# Patient Record
Sex: FEMALE | Race: WHITE | Employment: OTHER | URBAN - METROPOLITAN AREA
[De-identification: names, ages, dates, MRNs, and addresses within clinical notes are randomized per-mention and may not be internally consistent; named-entity substitution may affect disease eponyms.]

---

## 2017-02-02 ENCOUNTER — HOSPITAL ENCOUNTER (OUTPATIENT)
Dept: LAB | Age: 67
Discharge: HOME | End: 2017-02-02

## 2017-03-14 ENCOUNTER — HOSPITAL ENCOUNTER (OUTPATIENT)
Dept: LAB | Age: 67
Discharge: HOME | End: 2017-03-14

## 2018-02-06 ENCOUNTER — HOSPITAL ENCOUNTER (OUTPATIENT)
Dept: MRI IMAGING | Age: 68
Discharge: HOME | End: 2018-02-06
Payer: COMMERCIAL

## 2018-02-06 ENCOUNTER — HOSPITAL ENCOUNTER (OUTPATIENT)
Dept: GENERAL RADIOLOGY | Age: 68
Discharge: HOME | End: 2018-02-06
Payer: COMMERCIAL

## 2018-02-06 DIAGNOSIS — M54.12 CERVICAL RADICULOPATHY: ICD-10-CM

## 2018-02-06 PROCEDURE — 72156 MRI NECK SPINE W/O & W/DYE: CPT

## 2018-02-06 PROCEDURE — 72050 X-RAY EXAM NECK SPINE 4/5VWS: CPT

## 2018-03-15 ENCOUNTER — OFFICE VISIT (OUTPATIENT)
Dept: NEUROLOGY | Age: 68
End: 2018-03-15

## 2018-03-15 VITALS
WEIGHT: 147 LBS | RESPIRATION RATE: 12 BRPM | TEMPERATURE: 98 F | DIASTOLIC BLOOD PRESSURE: 78 MMHG | HEART RATE: 71 BPM | OXYGEN SATURATION: 97 % | SYSTOLIC BLOOD PRESSURE: 124 MMHG

## 2018-03-15 DIAGNOSIS — I67.89 CEREBRAL MICROVASCULAR DISEASE: ICD-10-CM

## 2018-03-15 DIAGNOSIS — I65.23 BILATERAL CAROTID ARTERY STENOSIS: Primary | ICD-10-CM

## 2018-03-15 DIAGNOSIS — G56.01 CARPAL TUNNEL SYNDROME OF RIGHT WRIST: ICD-10-CM

## 2018-03-15 DIAGNOSIS — M96.1 CERVICAL POST-LAMINECTOMY SYNDROME: ICD-10-CM

## 2018-03-15 RX ORDER — MULTIVITAMIN
2 TABLET ORAL DAILY
COMMUNITY

## 2018-03-15 RX ORDER — ASPIRIN 81 MG/1
TABLET ORAL DAILY
COMMUNITY

## 2018-03-15 RX ORDER — BISMUTH SUBSALICYLATE 262 MG
1 TABLET,CHEWABLE ORAL DAILY
COMMUNITY

## 2018-03-15 RX ORDER — ATORVASTATIN CALCIUM 10 MG/1
10 TABLET, FILM COATED ORAL DAILY
COMMUNITY
Start: 2018-01-13 | End: 2021-11-04 | Stop reason: SDUPTHER

## 2018-03-15 RX ORDER — ALENDRONATE SODIUM 70 MG/1
70 TABLET ORAL
COMMUNITY
Start: 2018-03-05 | End: 2021-11-04 | Stop reason: ALTCHOICE

## 2018-03-15 RX ORDER — ESTRADIOL 10 UG/1
10 INSERT VAGINAL 2 TIMES WEEKLY
COMMUNITY
Start: 2017-11-12

## 2018-03-15 NOTE — MR AVS SNAPSHOT
[address], 
[id number], [address] [facility] 
[phone] Patient: Baljit Dexter MRN: [id number] IPX:[id number] Visit Information Date & Time Provider Department Dept. Phone Encounter #  
 3/15/2018 10:00 AM Cortes Vásquez MD [facility]rology [facility] at [facility] [phone] [id number] Follow-up Instructions Return in about 6 months (around 9/15/2018). Upcoming Health Maintenance Date Due Hepatitis C Screening 1950 DTaP/Tdap/Td series (1 - Tdap) 8/16/1972 FOBT Q 1 YEAR AGE 50-75 8/16/2001 ZOSTER VACCINE AGE 60> 6/16/2011 BREAST CANCER SCRN MAMMOGRAM 4/30/2016 GLAUCOMA SCREENING Q2Y 8/16/2016 Bone Densitometry (Dexa) Screening 8/16/2016 Pneumococcal 65+ Low/Medium Risk (1 of 2 - PCV13) 8/16/2016 MEDICARE YEARLY EXAM 8/16/2016 Influenza Age 5 to Adult 8/1/2017 Allergies as of 3/15/2018  Review Complete On: 3/15/2018 By: Cortes áVsquez MD  
 No Known Allergies Current Immunizations  Never Reviewed No immunizations on file. Not reviewed this visit You Were Diagnosed With   
  
 Codes Comments Bilateral carotid artery stenosis    -  Primary ICD-10-CM: A52.11 ICD-9-CM: 433.10, 433.30 Cerebral microvascular disease     ICD-10-CM: I67.9 ICD-9-CM: 437.9 Cervical post-laminectomy syndrome     ICD-10-CM: M96.1 ICD-9-CM: 722.81 Vitals BP Pulse Temp Resp Weight(growth percentile) SpO2  
 124/78 71 98 °F (36.7 °C) 12 147 lb (66.7 kg) 97% Smoking Status Never Smoker Preferred Pharmacy Pharmacy Name Phone [facility] [address] [place] [address] [address], [address] [facility] Tw[place] Castillo [phone] Your Updated Medication List  
  
   
This list is accurate as of 3/15/18 10:43 AM.  Always use your most recent med list.  
  
  
  
  
 alendronate 70 mg tablet Commonly known as:  FOSAMAX 70 mg every seven (7) days. atorvastatin 10 mg tablet Commonly known as:  LIPITOR 10 mg daily. calcium-cholecalciferol (D3) tablet Commonly known as:  CALTRATE 600+D Take 2 Tabs by mouth daily. estradiol 10 mcg Tab vaginal tablet Commonly known as:  Juan J Wheeler 10 mcg two (2) times a week. MEGARED OMEGA-3 KRILL OIL PO Take 1 Tab by mouth daily. multivitamin tablet Commonly known as:  ONE A DAY Take 1 Tab by mouth daily. PRESERVISION AREDS 2 PO Take 2 Tabs by mouth daily. We Performed the Following TYLER COMPREHENSIVE PLUS PANEL [[id number] Custom] HOMOCYSTEINE, PLASMA [id number] CPT(R)] SED RATE (ESR) [id number] CPT(R)] Follow-up Instructions Return in about 6 months (around 9/15/2018). To-Do List   
 03/15/2018 Imaging:  DUPLEX CAROTID BILATERAL AMB NEURO   
  
 03/21/2018 Imaging:  MRA BRAIN WO CONT   
  
 03/21/2018 Imaging:  MRI BRAIN W WO CONT Patient Instructions Please be advised there is a $25 fee for all paperwork to be completed from our  providers. This is to be paid by the patient prior to picking up the completed forms. A Healthy Lifestyle: Care Instructions Your Care Instructions A healthy lifestyle can help you feel good, stay at a healthy weight, and have plenty of energy for both work and play. A healthy lifestyle is something you can share with your whole family. A healthy lifestyle also can lower your risk for serious health problems, such as high blood pressure, heart disease, and diabetes. You can follow a few steps listed below to improve your health and the health of your family. Follow-up care is a key part of your treatment and safety. Be sure to make and go to all appointments, and call your doctor if you are having problems. It's also a good idea to know your test results and keep a list of the medicines you take. How can you care for yourself at home? · Do not eat too much sugar, fat, or fast foods. You can still have dessert and treats now and then. The goal is moderation. · Start small to improve your eating habits. Pay attention to portion sizes, drink less juice and soda pop, and eat more fruits and vegetables. ¨ Eat a healthy amount of food. A 3-ounce serving of meat, for example, is about the size of a deck of cards. Fill the rest of your plate with vegetables and whole grains. ¨ Limit the amount of soda and sports drinks you have every day. Drink more water when you are thirsty. ¨ Eat at least 5 servings of fruits and vegetables every day. It may seem like a lot, but it is not hard to reach this goal. A serving or helping is 1 piece of fruit, 1 cup of vegetables, or 2 cups of leafy, raw vegetables. Have an apple or some carrot sticks as an afternoon snack instead of a candy bar. Try to have fruits and/or vegetables at every meal. 
· Make exercise part of your daily routine. You may want to start with simple activities, such as walking, bicycling, or slow swimming. Try to be active 30 to 60 minutes every day. You do not need to do all 30 to 60 minutes all at once. For example, you can exercise 3 times a day for 10 or 20 minutes. Moderate exercise is safe for most people, but it is always a good idea to talk to your doctor before starting an exercise program. 
· Keep moving. Evalee Webb the lawn, work in the garden, or [employer]. Take the stairs instead of the elevator at work. · If you smoke, quit. People who smoke have an increased risk for heart attack, stroke, cancer, and other lung illnesses. Quitting is hard, but there are ways to boost your chance of quitting tobacco for good. ¨ Use nicotine gum, patches, or lozenges. ¨ Ask your doctor about stop-smoking programs and medicines. ¨ Keep trying.  
In addition to reducing your risk of diseases in the future, you will notice some benefits soon after you stop using tobacco. If you have shortness of breath or asthma symptoms, they will likely get better within a few weeks after you quit. · Limit how much alcohol you drink. Moderate amounts of alcohol (up to 2 drinks a day for men, 1 drink a day for women) are okay. But drinking too much can lead to liver problems, high blood pressure, and other health problems. Family health If you have a family, there are many things you can do together to improve your health. · Eat meals together as a family as often as possible. · Eat healthy foods. This includes fruits, vegetables, lean meats and dairy, and whole grains. · Include your family in your fitness plan. Most people think of activities such as jogging or tennis as the way to fitness, but there are many ways you and your family can be more active. Anything that makes you breathe hard and gets your heart pumping is exercise. Here are some tips: 
¨ Walk to do errands or to take your child to school or the bus. ¨ Go for a family bike ride after dinner instead of watching TV. Where can you learn more? Go to http://giuseppe-natty.info/. Enter S342 in the search box to learn more about \"A Healthy Lifestyle: Care Instructions. \" Current as of: May 12, 2017 Content Version: 11.4 © 9484-9055 Healthwise, [employer]. Care instructions adapted under license by [employer] (which disclaims liability or warranty for this information). If you have questions about a medical condition or this instruction, always ask your healthcare professional. [place] [address] any warranty or liability for your use of this information. Introducing [facility]! Dear Ankit Choi: Thank you for requesting a [employer] account. Our records indicate that you already have an active [employer] account. You can access your account anytime at [email or web address] Did you know that you can access your hospital and ER discharge instructions at any time in Tagged? You can also review all of your test results from your hospital stay or ER visit. Additional Information If you have questions, please visit the Frequently Asked Questions section of the Tagged website at [email or web address] Remember, Tagged is NOT to be used for urgent needs. For medical emergencies, dial 911. Now available from your iPhone and Android! Please provide this summary of care documentation to your next provider. Your primary care clinician is listed as Avtar Alexandre. If you have any questions after today's visit, please call [phone].

## 2018-03-15 NOTE — PATIENT INSTRUCTIONS
Please be advised there is a $25 fee for all paperwork to be completed from our  providers. This is to be paid by the patient prior to picking up the completed forms. A Healthy Lifestyle: Care Instructions  Your Care Instructions    A healthy lifestyle can help you feel good, stay at a healthy weight, and have plenty of energy for both work and play. A healthy lifestyle is something you can share with your whole family. A healthy lifestyle also can lower your risk for serious health problems, such as high blood pressure, heart disease, and diabetes. You can follow a few steps listed below to improve your health and the health of your family. Follow-up care is a key part of your treatment and safety. Be sure to make and go to all appointments, and call your doctor if you are having problems. It's also a good idea to know your test results and keep a list of the medicines you take. How can you care for yourself at home? · Do not eat too much sugar, fat, or fast foods. You can still have dessert and treats now and then. The goal is moderation. · Start small to improve your eating habits. Pay attention to portion sizes, drink less juice and soda pop, and eat more fruits and vegetables. ¨ Eat a healthy amount of food. A 3-ounce serving of meat, for example, is about the size of a deck of cards. Fill the rest of your plate with vegetables and whole grains. ¨ Limit the amount of soda and sports drinks you have every day. Drink more water when you are thirsty. ¨ Eat at least 5 servings of fruits and vegetables every day. It may seem like a lot, but it is not hard to reach this goal. A serving or helping is 1 piece of fruit, 1 cup of vegetables, or 2 cups of leafy, raw vegetables. Have an apple or some carrot sticks as an afternoon snack instead of a candy bar. Try to have fruits and/or vegetables at every meal.  · Make exercise part of your daily routine.  You may want to start with simple activities, such as walking, bicycling, or slow swimming. Try to be active 30 to 60 minutes every day. You do not need to do all 30 to 60 minutes all at once. For example, you can exercise 3 times a day for 10 or 20 minutes. Moderate exercise is safe for most people, but it is always a good idea to talk to your doctor before starting an exercise program.  · Keep moving. Sachin Hoop the lawn, work in the garden, or [employer]. Take the stairs instead of the elevator at work. · If you smoke, quit. People who smoke have an increased risk for heart attack, stroke, cancer, and other lung illnesses. Quitting is hard, but there are ways to boost your chance of quitting tobacco for good. ¨ Use nicotine gum, patches, or lozenges. ¨ Ask your doctor about stop-smoking programs and medicines. ¨ Keep trying. In addition to reducing your risk of diseases in the future, you will notice some benefits soon after you stop using tobacco. If you have shortness of breath or asthma symptoms, they will likely get better within a few weeks after you quit. · Limit how much alcohol you drink. Moderate amounts of alcohol (up to 2 drinks a day for men, 1 drink a day for women) are okay. But drinking too much can lead to liver problems, high blood pressure, and other health problems. Family health  If you have a family, there are many things you can do together to improve your health. · Eat meals together as a family as often as possible. · Eat healthy foods. This includes fruits, vegetables, lean meats and dairy, and whole grains. · Include your family in your fitness plan. Most people think of activities such as jogging or tennis as the way to fitness, but there are many ways you and your family can be more active. Anything that makes you breathe hard and gets your heart pumping is exercise. Here are some tips:  ¨ Walk to do errands or to take your child to school or the bus. ¨ Go for a family bike ride after dinner instead of watching TV.   Where can you learn more? Go to http://giuseppe-natty.info/. Enter T080 in the search box to learn more about \"A Healthy Lifestyle: Care Instructions. \"  Current as of: May 12, 2017  Content Version: 11.4  © 1426-6284 Healthwise, Incorporated. Care instructions adapted under license by [employer] (which disclaims liability or warranty for this information). If you have questions about a medical condition or this instruction, always ask your healthcare professional. [address] any warranty or liability for your use of this information.

## 2018-03-15 NOTE — LETTER
3/15/2018 8:10 PM 
 
Patient: Mack Elizabeth YOB: 1950 Date of Visit: 3/15/2018 Dear No Recipients: Thank you for referring Ms. Mack Elizabeth to me for evaluation/treatment. Below are the relevant portions of my assessment and plan of care. Consult REFERRED BY: 
Javed Coughlin MD 
 
CHIEF COMPLAINT: Abnormal MRI scan Subjective: Mack Elizabeth is a 78 y.o. right-handed  female referred to us by Dr. Eddie Quiros for evaluation of a new problem as a new patient to us for recent abnormal MRI scan of the cervical spine showing some prominent white matter disease in the brainstem of unclear etiology. The patient had MRI scan done because of increasing neck pain, was found to have stable postop cervical films, without significant recurrent cord compression or disc herniation. Patient never had a history of stroke or cerebrovascular disease, and no history of unusual head trauma, or unusual metabolic syndromes like hyponatremia that may cause central myelinolysis. Patient is not a drinker or smoker and uses no drugs as other causes of this problem. She has no real vascular risk factors other than mild hyperlipidemia for which he only takes Lipitor 10 mg a day. She has no high blood pressure, does not smoke, is not diabetic and is fairly healthy otherwise. Her only other problem is macular degeneration that is mild. She has no cardiac history to suggest cardial embolic disease to cause her problems. She has no family history of unusual cerebrovascular disease. She did have a history of migraine headaches. He has never had any unusual head trauma but did have a mild whiplash injury in the past 10 years ago. Her MRI scans were all reviewed personally on the PACS system by myself today and I agree with reports that she does have the mild to moderate microvascular disease present.   She was operated on in 2009 for the disc disease and myelopathy, and still has some mild residual symptoms involving the right arm and leg. She is also complaining of increasing paresthesias in her right hand, that seem to wake her up at nighttime, and has a Tinel's over the median nerve suggesting carpal tunnel syndrome. We will obtain an EMG for that. We try to get a carotid Doppler study today, but the patient could not stay for the test.  They will be rescheduled. She was scheduled for an MRI and MRA of the brain to look for causes of this microvascular disease. She is advised to start taking a baby aspirin every day. Her PCP is monitoring her cholesterol and is doing well. She is very concerned that she is a high risk for stroke something bad is going on in her brain. She really has had no other new focal weakness, sensory loss or visual changes other than the increasing numbness in her right hand. Past Medical History:  
Diagnosis Date  Arthritis  Macular degeneration  Osteoporosis Past Surgical History:  
Procedure Laterality Date  HX ORTHOPAEDIC    
 2009: disc replaced neck Family History Problem Relation Age of Onset  Heart Disease Mother  Stroke Mother  Hypertension Mother  Cancer Father   
  unknown primary  Diabetes Sister  Obesity Sister  Heart Disease Brother  Hypertension Brother  Seizures Child  Migraines Child Social History Substance Use Topics  Smoking status: Never Smoker  Smokeless tobacco: Never Used  Alcohol use No  
   
 
Current Outpatient Prescriptions:  
  alendronate (FOSAMAX) 70 mg tablet, 70 mg every seven (7) days. , Disp: , Rfl:  
  atorvastatin (LIPITOR) 10 mg tablet, 10 mg daily. , Disp: , Rfl:  
  estradiol (VAGIFEM) 10 mcg tab vaginal tablet, 10 mcg two (2) times a week., Disp: , Rfl:  
  multivitamin (ONE A DAY) tablet, Take 1 Tab by mouth daily. , Disp: , Rfl:  
   calcium-cholecalciferol, D3, (CALTRATE 600+D) tablet, Take 2 Tabs by mouth daily. , Disp: , Rfl:  
  VIT C/E/ZN/COPPR/LUTEIN/ZEAXAN (PRESERVISION AREDS 2 PO), Take 2 Tabs by mouth daily. , Disp: , Rfl:  
  KRILL/OM-3/DHA/EPA/PHOSPHO/AST (MEGARED OMEGA-3 KRILL OIL PO), Take 1 Tab by mouth daily. , Disp: , Rfl:  
 
 
 
No Known Allergies Review of Systems: A comprehensive review of systems was negative except for: Musculoskeletal: positive for myalgias, arthralgias, stiff joints and neck pain Neurological: positive for paresthesia Vitals:  
 03/15/18 1024 BP: 124/78 Pulse: 71 Resp: 12 Temp: 98 °F (36.7 °C) SpO2: 97% Weight: 147 lb (66.7 kg) Objective: I 
 
 
NEUROLOGICAL EXAM: 
 
Appearance: The patient is well developed, well nourished, provides a coherent history and is in no acute distress. Mental Status: Oriented to time, place and person, and the president, cognitive function is normal and speech is fluent and no aphasia or dysarthria. Mood and affect appropriate, but somewhat anxious. Cranial Nerves:   Intact visual fields. Fundi are benign. MARINA, EOM's full, no nystagmus, no ptosis. Facial sensation is normal. Corneal reflexes are not tested. Facial movement is symmetric. Hearing is normal bilaterally. Palate is midline with normal sternocleidomastoid and trapezius muscles are normal. Tongue is midline. Neck without meningismus or bruits Temporal arteries are not tender or enlarged Motor:  5/5 strength in upper and lower proximal and distal muscles. Normal bulk and tone. No fasciculations. Reflexes:   Deep tendon reflexes 2+/4 and symmetrical. 
No babinski or clonus present Patient has a prominent Tinel's over the right median nerve Sensory:   Normal to touch, pinprick and vibration and temperature. DSS is intact Gait:  Normal gait. Tremor:   No tremor noted. Cerebellar:  No cerebellar signs present. Neurovascular:  Normal heart sounds and regular rhythm, peripheral pulses decreased, and no carotid bruits. Assessment: ICD-10-CM ICD-9-CM 1. Bilateral carotid artery stenosis I65.23 433.10 alendronate (FOSAMAX) 70 mg tablet 433.30 atorvastatin (LIPITOR) 10 mg tablet  
   estradiol (VAGIFEM) 10 mcg tab vaginal tablet  
   multivitamin (ONE A DAY) tablet  
   calcium-cholecalciferol, D3, (CALTRATE 600+D) tablet VIT C/E/ZN/COPPR/LUTEIN/ZEAXAN (PRESERVISION AREDS 2 PO) KRILL/OM-3/DHA/EPA/PHOSPHO/AST (MEGARED OMEGA-3 KRILL OIL PO) MRI BRAIN W WO CONT  
   DUPLEX CAROTID BILATERAL AMB NEURO  
   MRA BRAIN WO CONT  
   TYLER COMPREHENSIVE PLUS PANEL  
   SED RATE (ESR) HOMOCYSTEINE, PLASMA  
   EMG LIMITED 2. Cerebral microvascular disease I67.9 437.9 alendronate (FOSAMAX) 70 mg tablet  
   atorvastatin (LIPITOR) 10 mg tablet  
   estradiol (VAGIFEM) 10 mcg tab vaginal tablet  
   multivitamin (ONE A DAY) tablet  
   calcium-cholecalciferol, D3, (CALTRATE 600+D) tablet VIT C/E/ZN/COPPR/LUTEIN/ZEAXAN (PRESERVISION AREDS 2 PO) KRILL/OM-3/DHA/EPA/PHOSPHO/AST (MEGARED OMEGA-3 KRILL OIL PO) MRI BRAIN W WO CONT  
   DUPLEX CAROTID BILATERAL AMB NEURO  
   MRA BRAIN WO CONT  
   TYLER COMPREHENSIVE PLUS PANEL  
   SED RATE (ESR) HOMOCYSTEINE, PLASMA  
   EMG LIMITED 3. Cervical post-laminectomy syndrome, 2009 Dr Dave Mckinley M96.1 722.81 alendronate (FOSAMAX) 70 mg tablet  
   atorvastatin (LIPITOR) 10 mg tablet  
   estradiol (VAGIFEM) 10 mcg tab vaginal tablet  
   multivitamin (ONE A DAY) tablet  
   calcium-cholecalciferol, D3, (CALTRATE 600+D) tablet VIT C/E/ZN/COPPR/LUTEIN/ZEAXAN (PRESERVISION AREDS 2 PO) KRILL/OM-3/DHA/EPA/PHOSPHO/AST (MEGARED OMEGA-3 KRILL OIL PO) MRI BRAIN W WO CONT  
   DUPLEX CAROTID BILATERAL AMB NEURO  
   MRA BRAIN WO CONT  
   TYLER COMPREHENSIVE PLUS PANEL  
   SED RATE (ESR) HOMOCYSTEINE, PLASMA  
   EMG LIMITED 4. Carpal tunnel syndrome of right wrist G56.01 354.0 alendronate (FOSAMAX) 70 mg tablet  
   atorvastatin (LIPITOR) 10 mg tablet  
   estradiol (VAGIFEM) 10 mcg tab vaginal tablet  
   multivitamin (ONE A DAY) tablet  
   calcium-cholecalciferol, D3, (CALTRATE 600+D) tablet VIT C/E/ZN/COPPR/LUTEIN/ZEAXAN (PRESERVISION AREDS 2 PO) KRILL/OM-3/DHA/EPA/PHOSPHO/AST (MEGARED OMEGA-3 KRILL OIL PO) MRI BRAIN W WO CONT  
   DUPLEX CAROTID BILATERAL AMB NEURO  
   MRA BRAIN WO CONT  
   TYLER COMPREHENSIVE PLUS PANEL  
   SED RATE (ESR) HOMOCYSTEINE, PLASMA  
   EMG LIMITED Active Problems: * No active hospital problems. * 
 
 
Plan:  
 
Patient was an MRI scan is clearly abnormal showing prominent white matter disease in the brainstem that needs further evaluation to rule out cerebral vascular occlusive disease, arthritis, cerebritis, or other causes of her symptoms. She will get an MRI of the brain, with and without contrast, and an MRA of the brain She also was scheduled for carotid Doppler which he could not get today but will come back and get soon. She is to start aspirin every day now. She is to call us immediately for the problem in the interim. Multiple metabolic studies were also done to rule out vasculitis, arteritis, or other causes of her symptoms. Follow-up will be arranged in 3-6 months time after this test, and she will check the results of my chart will call us. Patient is a high risk for further neurologic deterioration she indeed has major vascular disease of the brain or arteritis. Patient will also be scheduled for an EMG because of her possible carpal tunnel syndrome on the right and left side Her films are reviewed personally on the PACS system by myself today, and I agree with reports, and she definitely needs further workup, and her films were reviewed with the patient in detail explaining the abnormality possible diagnosis prognosis and treatment options for the patient. Signed By: Hoang Pereira MD   
 March 15, 2018 CC: Elvie Taylor MD 
FAX: [phone] This note will not be viewable in [address] If you have questions, please do not hesitate to call me. I look forward to following MsEdward Shant Garcia along with you. Sincerely, Hoang Pereira MD

## 2018-03-16 LAB
CENTROMERE B AB SER-ACNC: <0.2 AI (ref 0–0.9)
CHROMATIN AB SERPL-ACNC: <0.2 AI (ref 0–0.9)
DSDNA AB SER-ACNC: 3 IU/ML (ref 0–9)
ENA JO1 AB SER-ACNC: <0.2 AI (ref 0–0.9)
ENA RNP AB SER-ACNC: <0.2 AI (ref 0–0.9)
ENA SCL70 AB SER-ACNC: <0.2 AI (ref 0–0.9)
ENA SM AB SER-ACNC: <0.2 AI (ref 0–0.9)
ENA SM+RNP AB SER-ACNC: <0.2 AI (ref 0–0.9)
ENA SS-A AB SER-ACNC: <0.2 AI (ref 0–0.9)
ENA SS-B AB SER-ACNC: <0.2 AI (ref 0–0.9)
ERYTHROCYTE [SEDIMENTATION RATE] IN BLOOD BY WESTERGREN METHOD: 2 MM/HR (ref 0–40)
HCYS SERPL-SCNC: 7.8 UMOL/L (ref 0–15)
RIBOSOMAL P AB SER-ACNC: <0.2 AI (ref 0–0.9)
SEE BELOW:, 164879: NORMAL

## 2018-03-16 NOTE — PROGRESS NOTES
Consult  REFERRED BY:  Abida Reid MD    CHIEF COMPLAINT: Abnormal MRI scan      Subjective: Germania Padilla is a 78 y.o. right-handed  female referred to us by Dr. Nile Orosco for evaluation of a new problem as a new patient to us for recent abnormal MRI scan of the cervical spine showing some prominent white matter disease in the brainstem of unclear etiology. The patient had MRI scan done because of increasing neck pain, was found to have stable postop cervical films, without significant recurrent cord compression or disc herniation. Patient never had a history of stroke or cerebrovascular disease, and no history of unusual head trauma, or unusual metabolic syndromes like hyponatremia that may cause central myelinolysis. Patient is not a drinker or smoker and uses no drugs as other causes of this problem. She has no real vascular risk factors other than mild hyperlipidemia for which he only takes Lipitor 10 mg a day. She has no high blood pressure, does not smoke, is not diabetic and is fairly healthy otherwise. Her only other problem is macular degeneration that is mild. She has no cardiac history to suggest cardial embolic disease to cause her problems. She has no family history of unusual cerebrovascular disease. She did have a history of migraine headaches. He has never had any unusual head trauma but did have a mild whiplash injury in the past 10 years ago. Her MRI scans were all reviewed personally on the PACS system by myself today and I agree with reports that she does have the mild to moderate microvascular disease present. She was operated on in 2009 for the disc disease and myelopathy, and still has some mild residual symptoms involving the right arm and leg. She is also complaining of increasing paresthesias in her right hand, that seem to wake her up at nighttime, and has a Tinel's over the median nerve suggesting carpal tunnel syndrome. We will obtain an EMG for that.   We try to get a carotid Doppler study today, but the patient could not stay for the test.  They will be rescheduled. She was scheduled for an MRI and MRA of the brain to look for causes of this microvascular disease. She is advised to start taking a baby aspirin every day. Her PCP is monitoring her cholesterol and is doing well. She is very concerned that she is a high risk for stroke something bad is going on in her brain. She really has had no other new focal weakness, sensory loss or visual changes other than the increasing numbness in her right hand. Past Medical History:   Diagnosis Date    Arthritis     Macular degeneration     Osteoporosis       Past Surgical History:   Procedure Laterality Date    HX ORTHOPAEDIC      2009: disc replaced neck     Family History   Problem Relation Age of Onset    Heart Disease Mother     Stroke Mother     Hypertension Mother     Cancer Father      unknown primary    Diabetes Sister     Obesity Sister     Heart Disease Brother     Hypertension Brother     Seizures Child     Migraines Child       Social History   Substance Use Topics    Smoking status: Never Smoker    Smokeless tobacco: Never Used    Alcohol use No         Current Outpatient Prescriptions:     alendronate (FOSAMAX) 70 mg tablet, 70 mg every seven (7) days. , Disp: , Rfl:     atorvastatin (LIPITOR) 10 mg tablet, 10 mg daily. , Disp: , Rfl:     estradiol (VAGIFEM) 10 mcg tab vaginal tablet, 10 mcg two (2) times a week., Disp: , Rfl:     multivitamin (ONE A DAY) tablet, Take 1 Tab by mouth daily. , Disp: , Rfl:     calcium-cholecalciferol, D3, (CALTRATE 600+D) tablet, Take 2 Tabs by mouth daily. , Disp: , Rfl:     VIT C/E/ZN/COPPR/LUTEIN/ZEAXAN (PRESERVISION AREDS 2 PO), Take 2 Tabs by mouth daily. , Disp: , Rfl:     KRILL/OM-3/DHA/EPA/PHOSPHO/AST (MEGARED OMEGA-3 KRILL OIL PO), Take 1 Tab by mouth daily. , Disp: , Rfl:         No Known Allergies     Review of Systems:  A comprehensive review of systems was negative except for: Musculoskeletal: positive for myalgias, arthralgias, stiff joints and neck pain  Neurological: positive for paresthesia   Vitals:    03/15/18 1024   BP: 124/78   Pulse: 71   Resp: 12   Temp: 98 °F (36.7 °C)   SpO2: 97%   Weight: 147 lb (66.7 kg)     Objective:     I      NEUROLOGICAL EXAM:    Appearance: The patient is well developed, well nourished, provides a coherent history and is in no acute distress. Mental Status: Oriented to time, place and person, and the president, cognitive function is normal and speech is fluent and no aphasia or dysarthria. Mood and affect appropriate, but somewhat anxious. Cranial Nerves:   Intact visual fields. Fundi are benign. MARINA, EOM's full, no nystagmus, no ptosis. Facial sensation is normal. Corneal reflexes are not tested. Facial movement is symmetric. Hearing is normal bilaterally. Palate is midline with normal sternocleidomastoid and trapezius muscles are normal. Tongue is midline. Neck without meningismus or bruits  Temporal arteries are not tender or enlarged   Motor:  5/5 strength in upper and lower proximal and distal muscles. Normal bulk and tone. No fasciculations. Reflexes:   Deep tendon reflexes 2+/4 and symmetrical.  No babinski or clonus present  Patient has a prominent Tinel's over the right median nerve   Sensory:   Normal to touch, pinprick and vibration and temperature. DSS is intact   Gait:  Normal gait. Tremor:   No tremor noted. Cerebellar:  No cerebellar signs present. Neurovascular:  Normal heart sounds and regular rhythm, peripheral pulses decreased, and no carotid bruits.            Assessment:       ICD-10-CM ICD-9-CM    1. Bilateral carotid artery stenosis I65.23 433.10 alendronate (FOSAMAX) 70 mg tablet     433.30 atorvastatin (LIPITOR) 10 mg tablet      estradiol (VAGIFEM) 10 mcg tab vaginal tablet      multivitamin (ONE A DAY) tablet      calcium-cholecalciferol, D3, (CALTRATE 600+D) tablet      VIT C/E/ZN/COPPR/LUTEIN/ZEAXAN (PRESERVISION AREDS 2 PO)      KRILL/OM-3/DHA/EPA/PHOSPHO/AST (MEGARED OMEGA-3 KRILL OIL PO)      MRI BRAIN W WO CONT      DUPLEX CAROTID BILATERAL AMB NEURO      MRA BRAIN WO CONT      TYLER COMPREHENSIVE PLUS PANEL      SED RATE (ESR)      HOMOCYSTEINE, PLASMA      EMG LIMITED   2. Cerebral microvascular disease I67.9 437.9 alendronate (FOSAMAX) 70 mg tablet      atorvastatin (LIPITOR) 10 mg tablet      estradiol (VAGIFEM) 10 mcg tab vaginal tablet      multivitamin (ONE A DAY) tablet      calcium-cholecalciferol, D3, (CALTRATE 600+D) tablet      VIT C/E/ZN/COPPR/LUTEIN/ZEAXAN (PRESERVISION AREDS 2 PO)      KRILL/OM-3/DHA/EPA/PHOSPHO/AST (MEGARED OMEGA-3 KRILL OIL PO)      MRI BRAIN W WO CONT      DUPLEX CAROTID BILATERAL AMB NEURO      MRA BRAIN WO CONT      TYLER COMPREHENSIVE PLUS PANEL      SED RATE (ESR)      HOMOCYSTEINE, PLASMA      EMG LIMITED   3. Cervical post-laminectomy syndrome, 2009 Dr Mendieta Rolling M96.1 722.81 alendronate (FOSAMAX) 70 mg tablet      atorvastatin (LIPITOR) 10 mg tablet      estradiol (VAGIFEM) 10 mcg tab vaginal tablet      multivitamin (ONE A DAY) tablet      calcium-cholecalciferol, D3, (CALTRATE 600+D) tablet      VIT C/E/ZN/COPPR/LUTEIN/ZEAXAN (PRESERVISION AREDS 2 PO)      KRILL/OM-3/DHA/EPA/PHOSPHO/AST (MEGARED OMEGA-3 KRILL OIL PO)      MRI BRAIN W WO CONT      DUPLEX CAROTID BILATERAL AMB NEURO      MRA BRAIN WO CONT      TYLER COMPREHENSIVE PLUS PANEL      SED RATE (ESR)      HOMOCYSTEINE, PLASMA      EMG LIMITED   4.  Carpal tunnel syndrome of right wrist G56.01 354.0 alendronate (FOSAMAX) 70 mg tablet      atorvastatin (LIPITOR) 10 mg tablet      estradiol (VAGIFEM) 10 mcg tab vaginal tablet      multivitamin (ONE A DAY) tablet      calcium-cholecalciferol, D3, (CALTRATE 600+D) tablet      VIT C/E/ZN/COPPR/LUTEIN/ZEAXAN (PRESERVISION AREDS 2 PO)      KRILL/OM-3/DHA/EPA/PHOSPHO/AST (MEGARED OMEGA-3 KRILL OIL PO)      MRI BRAIN W WO CONT      DUPLEX CAROTID BILATERAL AMB NEURO      MRA BRAIN WO CONT      TYLER COMPREHENSIVE PLUS PANEL      SED RATE (ESR)      HOMOCYSTEINE, PLASMA      EMG LIMITED     Active Problems:    * No active hospital problems. *      Plan:     Patient was an MRI scan is clearly abnormal showing prominent white matter disease in the brainstem that needs further evaluation to rule out cerebral vascular occlusive disease, arthritis, cerebritis, or other causes of her symptoms. She will get an MRI of the brain, with and without contrast, and an MRA of the brain  She also was scheduled for carotid Doppler which he could not get today but will come back and get soon. She is to start aspirin every day now. She is to call us immediately for the problem in the interim. Multiple metabolic studies were also done to rule out vasculitis, arteritis, or other causes of her symptoms. Follow-up will be arranged in 3-6 months time after this test, and she will check the results of my chart will call us. Patient is a high risk for further neurologic deterioration she indeed has major vascular disease of the brain or arteritis. Patient will also be scheduled for an EMG because of her possible carpal tunnel syndrome on the right and left side  Her films are reviewed personally on the PACS system by myself today, and I agree with reports, and she definitely needs further workup, and her films were reviewed with the patient in detail explaining the abnormality possible diagnosis prognosis and treatment options for the patient. Signed By: Maritza Estevez MD     March 15, 2018       CC: Jovany García MD  FAX: [phone]    This note will not be viewable in [address]

## 2018-03-20 ENCOUNTER — OFFICE VISIT (OUTPATIENT)
Dept: NEUROLOGY | Age: 68
End: 2018-03-20

## 2018-03-20 DIAGNOSIS — G56.01 CARPAL TUNNEL SYNDROME OF RIGHT WRIST: ICD-10-CM

## 2018-03-20 DIAGNOSIS — I65.23 BILATERAL CAROTID ARTERY STENOSIS: ICD-10-CM

## 2018-03-20 DIAGNOSIS — M96.1 CERVICAL POST-LAMINECTOMY SYNDROME: ICD-10-CM

## 2018-03-20 DIAGNOSIS — I67.89 CEREBRAL MICROVASCULAR DISEASE: Primary | ICD-10-CM

## 2018-03-20 NOTE — PROCEDURES
This study consisted of pulsed wave Doppler examination, Color-flow imaging, and Duplex imaging of both the right and left carotid systems, and both vertebral arteries.        Imaging of both right and left carotid systems showed no mixed plaquing at the bifurcations and proximal and distal internal and external carotid arteries bilaterally, with stenosis in the range of 0% only and with no flow abnormalities identified.        Both vertebral arteries showed normal antegrade flow.         Clinical correlation recommended

## 2018-03-29 ENCOUNTER — HOSPITAL ENCOUNTER (OUTPATIENT)
Dept: MRI IMAGING | Age: 68
Discharge: HOME | End: 2018-03-29
Payer: COMMERCIAL

## 2018-03-29 DIAGNOSIS — M96.1 CERVICAL POST-LAMINECTOMY SYNDROME: ICD-10-CM

## 2018-03-29 DIAGNOSIS — G56.01 CARPAL TUNNEL SYNDROME OF RIGHT WRIST: ICD-10-CM

## 2018-03-29 DIAGNOSIS — I67.89 CEREBRAL MICROVASCULAR DISEASE: ICD-10-CM

## 2018-03-29 DIAGNOSIS — I65.23 BILATERAL CAROTID ARTERY STENOSIS: ICD-10-CM

## 2018-03-29 DIAGNOSIS — M96.1 POSTLAMINECTOMY SYNDROME, CERVICAL REGION: ICD-10-CM

## 2018-03-29 PROCEDURE — 70553 MRI BRAIN STEM W/O & W/DYE: CPT

## 2018-03-29 PROCEDURE — 70544 MR ANGIOGRAPHY HEAD W/O DYE: CPT

## 2018-03-29 PROCEDURE — A9576 INJ PROHANCE MULTIPACK: HCPCS | Performed by: RADIOLOGY

## 2018-03-29 RX ORDER — SODIUM CHLORIDE 0.9 % (FLUSH) 0.9 %
10 SYRINGE (ML) INJECTION
Status: COMPLETED | OUTPATIENT
Start: 2018-03-29 | End: 2018-03-29

## 2018-03-29 RX ADMIN — Medication 10 ML: at 13:00

## 2018-09-29 ENCOUNTER — HOSPITAL ENCOUNTER (EMERGENCY)
Age: 68
Discharge: HOME | End: 2018-09-29
Attending: EMERGENCY MEDICINE
Payer: COMMERCIAL

## 2018-09-29 ENCOUNTER — APPOINTMENT (OUTPATIENT)
Dept: GENERAL RADIOLOGY | Age: 68
End: 2018-09-29
Attending: EMERGENCY MEDICINE
Payer: COMMERCIAL

## 2018-09-29 DIAGNOSIS — R07.2 PRECORDIAL PAIN: Primary | ICD-10-CM

## 2018-09-29 LAB
ALBUMIN SERPL-MCNC: 4.1 G/DL (ref 3.5–5)
ALBUMIN/GLOB SERPL: 1.3 {RATIO} (ref 1.1–2.2)
ALP SERPL-CCNC: 45 U/L (ref 45–117)
ALT SERPL-CCNC: 26 U/L (ref 12–78)
ANION GAP SERPL CALC-SCNC: 5 MMOL/L (ref 5–15)
AST SERPL-CCNC: 22 U/L (ref 15–37)
BASOPHILS # BLD: 0 K/UL (ref 0–0.1)
BASOPHILS NFR BLD: 1 % (ref 0–1)
BILIRUB SERPL-MCNC: 0.5 MG/DL (ref 0.2–1)
BUN SERPL-MCNC: 23 MG/DL (ref 6–20)
BUN/CREAT SERPL: 24 (ref 12–20)
CALCIUM SERPL-MCNC: 8.9 MG/DL (ref 8.5–10.1)
CHLORIDE SERPL-SCNC: 106 MMOL/L (ref 97–108)
CK SERPL-CCNC: 162 U/L (ref 26–192)
CO2 SERPL-SCNC: 28 MMOL/L (ref 21–32)
COMMENT, HOLDF: NORMAL
CREAT SERPL-MCNC: 0.94 MG/DL (ref 0.55–1.02)
DIFFERENTIAL METHOD BLD: NORMAL
EOSINOPHIL # BLD: 0.2 K/UL (ref 0–0.4)
EOSINOPHIL NFR BLD: 3 % (ref 0–7)
ERYTHROCYTE [DISTWIDTH] IN BLOOD BY AUTOMATED COUNT: 13.1 % (ref 11.5–14.5)
GLOBULIN SER CALC-MCNC: 3.2 G/DL (ref 2–4)
GLUCOSE SERPL-MCNC: 89 MG/DL (ref 65–100)
HCT VFR BLD AUTO: 40.8 % (ref 35–47)
HGB BLD-MCNC: 13.3 G/DL (ref 11.5–16)
IMM GRANULOCYTES # BLD: 0 K/UL (ref 0–0.04)
IMM GRANULOCYTES NFR BLD AUTO: 0 % (ref 0–0.5)
INR PPP: 1 (ref 0.9–1.1)
LYMPHOCYTES # BLD: 2.4 K/UL (ref 0.8–3.5)
LYMPHOCYTES NFR BLD: 34 % (ref 12–49)
MCH RBC QN AUTO: 30 PG (ref 26–34)
MCHC RBC AUTO-ENTMCNC: 32.6 G/DL (ref 30–36.5)
MCV RBC AUTO: 92.1 FL (ref 80–99)
MONOCYTES # BLD: 0.7 K/UL (ref 0–1)
MONOCYTES NFR BLD: 10 % (ref 5–13)
NEUTS SEG # BLD: 3.8 K/UL (ref 1.8–8)
NEUTS SEG NFR BLD: 53 % (ref 32–75)
NRBC # BLD: 0 K/UL (ref 0–0.01)
NRBC BLD-RTO: 0 PER 100 WBC
PLATELET # BLD AUTO: 222 K/UL (ref 150–400)
PMV BLD AUTO: 10.4 FL (ref 8.9–12.9)
POTASSIUM SERPL-SCNC: 3.9 MMOL/L (ref 3.5–5.1)
PROT SERPL-MCNC: 7.3 G/DL (ref 6.4–8.2)
PROTHROMBIN TIME: 9.9 SEC (ref 9–11.1)
RBC # BLD AUTO: 4.43 M/UL (ref 3.8–5.2)
SAMPLES BEING HELD,HOLD: NORMAL
SODIUM SERPL-SCNC: 139 MMOL/L (ref 136–145)
TROPONIN I SERPL-MCNC: <0.05 NG/ML
TROPONIN I SERPL-MCNC: <0.05 NG/ML
WBC # BLD AUTO: 7.2 K/UL (ref 3.6–11)

## 2018-09-29 PROCEDURE — 74011250637 HC RX REV CODE- 250/637: Performed by: EMERGENCY MEDICINE

## 2018-09-29 PROCEDURE — 82550 ASSAY OF CK (CPK): CPT | Performed by: EMERGENCY MEDICINE

## 2018-09-29 PROCEDURE — 93005 ELECTROCARDIOGRAM TRACING: CPT

## 2018-09-29 PROCEDURE — 99284 EMERGENCY DEPT VISIT MOD MDM: CPT

## 2018-09-29 PROCEDURE — 85610 PROTHROMBIN TIME: CPT | Performed by: EMERGENCY MEDICINE

## 2018-09-29 PROCEDURE — 84484 ASSAY OF TROPONIN QUANT: CPT | Performed by: EMERGENCY MEDICINE

## 2018-09-29 PROCEDURE — 71046 X-RAY EXAM CHEST 2 VIEWS: CPT

## 2018-09-29 PROCEDURE — 80053 COMPREHEN METABOLIC PANEL: CPT | Performed by: EMERGENCY MEDICINE

## 2018-09-29 PROCEDURE — 36415 COLL VENOUS BLD VENIPUNCTURE: CPT | Performed by: EMERGENCY MEDICINE

## 2018-09-29 PROCEDURE — 85025 COMPLETE CBC W/AUTO DIFF WBC: CPT | Performed by: EMERGENCY MEDICINE

## 2018-09-29 RX ORDER — ASPIRIN 325 MG
325 TABLET ORAL
Status: COMPLETED | OUTPATIENT
Start: 2018-09-29 | End: 2018-09-29

## 2018-09-29 RX ADMIN — ASPIRIN 325 MG: 325 TABLET ORAL at 21:13

## 2018-09-29 NOTE — ED TRIAGE NOTES
Chest pain onset 7pm with dizziness and jaw pain lasting 5-10 minutes. \"Pain subsided but my jaws still feel funny and I'm tired\".

## 2018-09-30 VITALS
OXYGEN SATURATION: 96 % | RESPIRATION RATE: 11 BRPM | TEMPERATURE: 97.4 F | HEART RATE: 71 BPM | BODY MASS INDEX: 24.03 KG/M2 | HEIGHT: 65 IN | DIASTOLIC BLOOD PRESSURE: 70 MMHG | WEIGHT: 144.25 LBS | SYSTOLIC BLOOD PRESSURE: 136 MMHG

## 2018-09-30 LAB
ATRIAL RATE: 76 BPM
CALCULATED P AXIS, ECG09: 70 DEGREES
CALCULATED R AXIS, ECG10: -5 DEGREES
CALCULATED T AXIS, ECG11: 39 DEGREES
DIAGNOSIS, 93000: NORMAL
P-R INTERVAL, ECG05: 166 MS
Q-T INTERVAL, ECG07: 372 MS
QRS DURATION, ECG06: 74 MS
QTC CALCULATION (BEZET), ECG08: 418 MS
VENTRICULAR RATE, ECG03: 76 BPM

## 2018-09-30 NOTE — ED NOTES
2354:  MD reviewed discharge instructions and options with patient and patient verbalized understanding. RN reviewed discharge instructions using teachback method. Pt ambulated to exit without difficulty and in no signs of acute distress escorted by spouse, and spouse will drive home. No complaints or needs expressed at this time. Patient was counseled on medications prescribed at discharge. VSS, verbalized relief from most intense pain. Patient to call Dr. Shelby Florez in the morning for appointment.

## 2018-09-30 NOTE — DISCHARGE INSTRUCTIONS
Chest Pain: Care Instructions  Your Care Instructions    There are many things that can cause chest pain. Some are not serious and will get better on their own in a few days. But some kinds of chest pain need more testing and treatment. Your doctor may have recommended a follow-up visit in the next 8 to 12 hours. If you are not getting better, you may need more tests or treatment. Even though your doctor has released you, you still need to watch for any problems. The doctor carefully checked you, but sometimes problems can develop later. If you have new symptoms or if your symptoms do not get better, get medical care right away. If you have worse or different chest pain or pressure that lasts more than 5 minutes or you passed out (lost consciousness), call 911 or seek other emergency help right away. A medical visit is only one step in your treatment. Even if you feel better, you still need to do what your doctor recommends, such as going to all suggested follow-up appointments and taking medicines exactly as directed. This will help you recover and help prevent future problems. How can you care for yourself at home? · Rest until you feel better. · Take your medicine exactly as prescribed. Call your doctor if you think you are having a problem with your medicine. · Do not drive after taking a prescription pain medicine. When should you call for help? Call 911 if:    · You passed out (lost consciousness).     · You have severe difficulty breathing.     · You have symptoms of a heart attack. These may include:  ¨ Chest pain or pressure, or a strange feeling in your chest.  ¨ Sweating. ¨ Shortness of breath. ¨ Nausea or vomiting. ¨ Pain, pressure, or a strange feeling in your back, neck, jaw, or upper belly or in one or both shoulders or arms. ¨ Lightheadedness or sudden weakness. ¨ A fast or irregular heartbeat.   After you call 911, the  may tell you to chew 1 adult-strength or 2 to 4 low-dose aspirin. Wait for an ambulance. Do not try to drive yourself.    Call your doctor today if:    · You have any trouble breathing.     · Your chest pain gets worse.     · You are dizzy or lightheaded, or you feel like you may faint.     · You are not getting better as expected.     · You are having new or different chest pain. Where can you learn more? Go to http://giuseppe-natty.info/. Enter A120 in the search box to learn more about \"Chest Pain: Care Instructions. \"  Current as of: November 20, 2017  Content Version: 11.7  © 1751-8893 [employer]. Care instructions adapted under license by [employer] (which disclaims liability or warranty for this information). If you have questions about a medical condition or this instruction, always ask your healthcare professional. [address] any warranty or liability for your use of this information.

## 2018-09-30 NOTE — ED PROVIDER NOTES
HPI Comments: 80 y.o. female with past medical history significant for Hypercholesterolemia who presents from Home with chief complaint of Chest Pain. Patient states sudden onset \"at 1900\" of episodic mid sternal chest pain. Patient reports episode of mid sternal chest pain described as \"pressure\" with radiation into her right shoulder down to her abdomen and into her left jaw that lasted for a duration of \"about 10 to 15 minutes\". Pt states accompanying dizziness. Patient reports relieving symptoms since onset. Upon arrival to [facility] ED, patient denies any current pain or discomfort. Pt denies previous history of symptoms similar to those presented today. Pt denies significant cardiac history. Pt denies fever, chills, cough, congestion, shortness of breath, abdominal pain, nausea, vomiting, diarrhea, difficulty with urination or dysuria. There are no other acute medical concerns at this time. PCP: Key Denton MD 
 
Social History: Tobacco Use: No 
 
Note written by Leanna Tran, as dictated by Nina Hauser MD 8:37 PM 
 
The history is provided by the patient. Past Medical History:  
Diagnosis Date  Arthritis  Macular degeneration  Osteoporosis Past Surgical History:  
Procedure Laterality Date  HX ORTHOPAEDIC    
 2009: disc replaced neck Family History:  
Problem Relation Age of Onset  Heart Disease Mother  Stroke Mother  Hypertension Mother  Cancer Father   
  unknown primary  Diabetes Sister  Obesity Sister  Heart Disease Brother  Hypertension Brother  Seizures Child  Migraines Child Social History Social History  Marital status:  Spouse name: N/A  
 Number of children: N/A  
 Years of education: N/A Occupational History  Not on file. Social History Main Topics  Smoking status: Never Smoker  Smokeless tobacco: Never Used  Alcohol use No  
 Drug use: Not on file  Sexual activity: Not on file Other Topics Concern  Not on file Social History Narrative ALLERGIES: Review of patient's allergies indicates no known allergies. Review of Systems Constitutional: Negative for chills and fever. HENT: Negative for congestion. Respiratory: Negative for cough and shortness of breath. Cardiovascular: Positive for chest pain (Resolved). Gastrointestinal: Negative for abdominal pain, diarrhea, nausea and vomiting. Genitourinary: Negative for difficulty urinating and dysuria. Neurological: Positive for dizziness (Resolved). All other systems reviewed and are negative. Vitals:  
 09/29/18 1938 BP: 157/85 Pulse: 78 Resp: 16 Temp: 97.9 °F (36.6 °C) SpO2: 97% Weight: 65.4 kg (144 lb 4 oz) Height: 5' 5\" (1.651 m) Physical Exam  
Constitutional: She appears well-developed and well-nourished. No distress. HENT:  
Head: Normocephalic and atraumatic. Eyes: Conjunctivae are normal.  
Neck: Neck supple. Cardiovascular: Normal rate, regular rhythm and normal heart sounds. Pulmonary/Chest: Effort normal and breath sounds normal. No stridor. No respiratory distress. Abdominal: She exhibits no distension. Musculoskeletal: Normal range of motion. Neurological: She is alert. Coordination normal.  
Skin: Skin is warm and dry. Psychiatric: She has a normal mood and affect. Nursing note and vitals reviewed. Note written by Deanna Lewis, as dictated by Teresa Wang MD 8:40 PM 
 
MDM 
 
80 y.o. female presents with episode of chest , jaw and epigastric pain earlier tonight. Serial troponins negative with nonischemic EKG and resolved pain. Only risk factors of age and HLD. Doubt ACS currently, DDX includes coronary vasospasm or GI etiology. Discussed strict return precautions for recurrent pain and cardiology follow up for further evaluation.  Plan to follow up with PCP as needed and return precautions discussed for worsening or new concerning symptoms. ED Course Procedures EKG 1937: Rate 76 normal EKG, normal sinus rhythm, no ST/T abnormalities.

## 2021-10-28 ENCOUNTER — TELEPHONE (OUTPATIENT)
Dept: INTERNAL MEDICINE CLINIC | Age: 71
End: 2021-10-28

## 2021-10-28 NOTE — TELEPHONE ENCOUNTER
I called patient to confirm her appt next Thursday, 11/4/21 with Dr. Lee Estrada. Patient asked if we had received her records from [place] [id number]?     I called [facility] and left a message with the records dept to ask about status of this request.

## 2021-11-04 ENCOUNTER — OFFICE VISIT (OUTPATIENT)
Dept: INTERNAL MEDICINE CLINIC | Age: 71
End: 2021-11-04
Payer: MEDICARE

## 2021-11-04 VITALS
HEIGHT: 64 IN | DIASTOLIC BLOOD PRESSURE: 60 MMHG | HEART RATE: 83 BPM | TEMPERATURE: 97.3 F | WEIGHT: 149 LBS | SYSTOLIC BLOOD PRESSURE: 110 MMHG | RESPIRATION RATE: 16 BRPM | BODY MASS INDEX: 25.44 KG/M2 | OXYGEN SATURATION: 99 %

## 2021-11-04 DIAGNOSIS — I65.23 BILATERAL CAROTID ARTERY STENOSIS: ICD-10-CM

## 2021-11-04 DIAGNOSIS — Z00.00 MEDICARE ANNUAL WELLNESS VISIT, SUBSEQUENT: ICD-10-CM

## 2021-11-04 DIAGNOSIS — I67.89 CEREBRAL MICROVASCULAR DISEASE: ICD-10-CM

## 2021-11-04 DIAGNOSIS — G56.01 CARPAL TUNNEL SYNDROME OF RIGHT WRIST: ICD-10-CM

## 2021-11-04 DIAGNOSIS — M81.0 OSTEOPOROSIS, UNSPECIFIED OSTEOPOROSIS TYPE, UNSPECIFIED PATHOLOGICAL FRACTURE PRESENCE: ICD-10-CM

## 2021-11-04 DIAGNOSIS — E78.00 PURE HYPERCHOLESTEROLEMIA: ICD-10-CM

## 2021-11-04 DIAGNOSIS — M96.1 CERVICAL POST-LAMINECTOMY SYNDROME: ICD-10-CM

## 2021-11-04 DIAGNOSIS — Z23 NEEDS FLU SHOT: Primary | ICD-10-CM

## 2021-11-04 DIAGNOSIS — Z23 ENCOUNTER FOR IMMUNIZATION: ICD-10-CM

## 2021-11-04 PROCEDURE — 90732 PPSV23 VACC 2 YRS+ SUBQ/IM: CPT | Performed by: INTERNAL MEDICINE

## 2021-11-04 PROCEDURE — G8536 NO DOC ELDER MAL SCRN: HCPCS | Performed by: INTERNAL MEDICINE

## 2021-11-04 PROCEDURE — 1090F PRES/ABSN URINE INCON ASSESS: CPT | Performed by: INTERNAL MEDICINE

## 2021-11-04 PROCEDURE — 1101F PT FALLS ASSESS-DOCD LE1/YR: CPT | Performed by: INTERNAL MEDICINE

## 2021-11-04 PROCEDURE — G0009 ADMIN PNEUMOCOCCAL VACCINE: HCPCS | Performed by: INTERNAL MEDICINE

## 2021-11-04 PROCEDURE — G8510 SCR DEP NEG, NO PLAN REQD: HCPCS | Performed by: INTERNAL MEDICINE

## 2021-11-04 PROCEDURE — 90694 VACC AIIV4 NO PRSRV 0.5ML IM: CPT | Performed by: INTERNAL MEDICINE

## 2021-11-04 PROCEDURE — 99203 OFFICE O/P NEW LOW 30 MIN: CPT | Performed by: INTERNAL MEDICINE

## 2021-11-04 PROCEDURE — G8419 CALC BMI OUT NRM PARAM NOF/U: HCPCS | Performed by: INTERNAL MEDICINE

## 2021-11-04 PROCEDURE — G0439 PPPS, SUBSEQ VISIT: HCPCS | Performed by: INTERNAL MEDICINE

## 2021-11-04 PROCEDURE — 3017F COLORECTAL CA SCREEN DOC REV: CPT | Performed by: INTERNAL MEDICINE

## 2021-11-04 PROCEDURE — G8427 DOCREV CUR MEDS BY ELIG CLIN: HCPCS | Performed by: INTERNAL MEDICINE

## 2021-11-04 PROCEDURE — G0008 ADMIN INFLUENZA VIRUS VAC: HCPCS | Performed by: INTERNAL MEDICINE

## 2021-11-04 RX ORDER — ATORVASTATIN CALCIUM 10 MG/1
10 TABLET, FILM COATED ORAL DAILY
Qty: 90 TABLET | Refills: 3 | Status: SHIPPED | OUTPATIENT
Start: 2021-11-04 | End: 2022-04-19 | Stop reason: SDUPTHER

## 2021-11-04 NOTE — PATIENT INSTRUCTIONS
Office Policies    Phone calls/patient messages:            Please allow up to 24 hours for someone in the office to contact you about your call or message. Be mindful your provider may be out of the office or your message may require further review. We encourage you to use [employer] for your messages as this is a faster, more efficient way to communicate with our office                         Medication Refills:            Prescription medications require 48-72 business hours to process. We encourage you to use [employer] for your refills. For controlled medications: Please allow 72 business hours to process. Certain medications may require you to  a written prescription at our office. NO narcotic/controlled medications will be prescribed after 4pm Monday through Friday or on weekends              Form/Paperwork Completion:            Please note a $25 fee may incur for all paperwork for completed by our providers. We ask that you allow 7-10 business days. Pre-payment is due prior to picking up/faxing the completed form. You may also download your forms to [employer] to have your doctor print off. Medicare Wellness Visit, Female     The best way to live healthy is to have a lifestyle where you eat a well-balanced diet, exercise regularly, limit alcohol use, and quit all forms of tobacco/nicotine, if applicable. Regular preventive services are another way to keep healthy. Preventive services (vaccines, screening tests, monitoring & exams) can help personalize your care plan, which helps you manage your own care. Screening tests can find health problems at the earliest stages, when they are easiest to treat. [facility] follows the current, evidence-based guidelines published by the [facility] Jeremiah Baez (USPSTF) when recommending preventive services for our patients.  Because we follow these guidelines, sometimes recommendations change over time as research supports it. (For example, mammograms used to be recommended annually. Even though Medicare will still pay for an annual mammogram, the newer guidelines recommend a mammogram every two years for women of average risk). Of course, you and your doctor may decide to screen more often for some diseases, based on your risk and your co-morbidities (chronic disease you are already diagnosed with). Preventive services for you include:  - Medicare offers their members a free annual wellness visit, which is time for you and your primary care provider to discuss and plan for your preventive service needs. Take advantage of this benefit every year!  -All adults over the age of 72 should receive the recommended pneumonia vaccines. Current USPSTF guidelines recommend a series of two vaccines for the best pneumonia protection.   -All adults should have a flu vaccine yearly and a tetanus vaccine every 10 years.   -All adults age 48 and older should receive the shingles vaccines (series of two vaccines). -All adults age 38-68 who are overweight should have a diabetes screening test once every three years.   -All adults born between [birth year] and 1965 should be screened once for Hepatitis C.  -Other screening tests and preventive services for persons with diabetes include: an eye exam to screen for diabetic retinopathy, a kidney function test, a foot exam, and stricter control over your cholesterol.   -Cardiovascular screening for adults with routine risk involves an electrocardiogram (ECG) at intervals determined by your doctor.   -Colorectal cancer screenings should be done for adults age 54-65 with no increased risk factors for colorectal cancer. There are a number of acceptable methods of screening for this type of cancer. Each test has its own benefits and drawbacks. Discuss with your doctor what is most appropriate for you during your annual wellness visit.  The different tests include: colonoscopy (considered the best screening method), a fecal occult blood test, a fecal DNA test, and sigmoidoscopy.    -A bone mass density test is recommended when a woman turns 65 to screen for osteoporosis. This test is only recommended one time, as a screening. Some providers will use this same test as a disease monitoring tool if you already have osteoporosis. -Breast cancer screenings are recommended every other year for women of normal risk, age 54-69.  -Cervical cancer screenings for women over age 72 are only recommended with certain risk factors.      Here is a list of your current Health Maintenance items (your personalized list of preventive services) with a due date:  Health Maintenance Due   Topic Date Due    Hepatitis C Test  Never done    Cholesterol Test   Never done    DTaP/Tdap/Td  (1 - Tdap) Never done    Colorectal Screening  Never done    Shingles Vaccine (1 of 2) Never done    Mammogram  04/30/2016    Bone Mineral Density   Never done    Pneumococcal Vaccine (1 of 1 - PPSV23) Never done    Yearly Flu Vaccine (1) Never done

## 2021-11-04 NOTE — PROGRESS NOTES
HISTORY OF PRESENT ILLNESS  Lonnie Carrillo is a 80 y.o. female. HPI     Pt here to establish care    Former PCP-Dr HANY Nuñez records    Hx HLD osteoporosis , cerebral microvascular dz  Had lipids checked this year -some dizziness so lowered to 10 mg every day at [facility]    Had soto for microvascular dx a few years ago--normal carotid dopplers. Normal for age per Neuro notes  FH CVA--mother    Pt exercises and walks several days per week  Feels well overall    Gyn  MD Dr Esteban Cifuentes normalized and stopped fosamax 2yrs after taking for about 3 yrs    Had colonoscopy < 10 yrs ago with Dr Manjeet Carpenter per pt--no polyps    Sees DERM MD--hx skin cancer on nose removed  Sees eye MD for mild macular degeneration    Patient Active Problem List    Diagnosis Date Noted    Cerebral microvascular disease 03/15/2018    Cervical post-laminectomy syndrome, 2009 Dr Kadi Lorenzo 03/15/2018    Carpal tunnel syndrome of right wrist 03/15/2018     Current Outpatient Medications   Medication Sig Dispense Refill    atorvastatin (LIPITOR) 10 mg tablet Take 1 Tablet by mouth daily. 90 Tablet 3    estradiol (VAGIFEM) 10 mcg tab vaginal tablet 10 mcg two (2) times a week.  multivitamin (ONE A DAY) tablet Take 1 Tab by mouth daily.  calcium-cholecalciferol, D3, (CALTRATE 600+D) tablet Take 2 Tabs by mouth daily.  VIT C/E/ZN/COPPR/LUTEIN/ZEAXAN (PRESERVISION AREDS 2 PO) Take 2 Tabs by mouth daily.  aspirin delayed-release 81 mg tablet Take  by mouth daily.        No Known Allergies  Past Medical History:   Diagnosis Date    Macular degeneration     Osteoporosis      Past Surgical History:   Procedure Laterality Date    HX ORTHOPAEDIC      2009: disc replaced neck    HX PARTIAL HYSTERECTOMY       Family History   Problem Relation Age of Onset    Heart Disease Mother     Stroke Mother     Hypertension Mother     Diabetes Mother     Cancer Father         unknown primary    Diabetes Sister     Obesity Sister    [facility] Heart Disease Brother     Hypertension Brother     Seizures Child     Migraines Child      Social History     Tobacco Use    Smoking status: Never Smoker    Smokeless tobacco: Never Used   Substance Use Topics    Alcohol use: No           Review of Systems   Constitutional: Negative for chills, fever, malaise/fatigue and weight loss. Eyes: Negative for blurred vision and double vision. Respiratory: Negative for cough and shortness of breath. Cardiovascular: Negative for chest pain and palpitations. Gastrointestinal: Negative for abdominal pain, blood in stool, constipation, diarrhea, melena, nausea and vomiting. Genitourinary: Negative for dysuria, frequency, hematuria and urgency. Musculoskeletal: Negative for back pain, falls, joint pain and myalgias. Neurological: Negative for dizziness, tremors and headaches. Physical Exam  Vitals and nursing note reviewed. Constitutional:       Appearance: Normal appearance. She is well-developed. Comments: Appears stated age   HENT:      Head: Normocephalic. Right Ear: Tympanic membrane normal.      Left Ear: Tympanic membrane normal.      Nose: Nose normal.   Cardiovascular:      Rate and Rhythm: Normal rate and regular rhythm. Heart sounds: Normal heart sounds. No murmur heard. No friction rub. No gallop. Pulmonary:      Effort: Pulmonary effort is normal. No respiratory distress. Breath sounds: Normal breath sounds. No wheezing. Abdominal:      General: Bowel sounds are normal.      Palpations: Abdomen is soft. Musculoskeletal:      Cervical back: Normal range of motion. Skin:     General: Skin is warm. Neurological:      General: No focal deficit present. Mental Status: She is alert. ASSESSMENT and PLAN  Diagnoses and all orders for this visit:    1. Needs flu shot  -     FLU (FLUAD QUAD INFLUENZA VACCINE,QUAD,ADJUVANTED)    2. Pure hypercholesterolemia  -     LIPID PANEL;  Future  -     METABOLIC PANEL, COMPREHENSIVE; Future  -     CBC W/O DIFF; Future  -     TSH 3RD GENERATION; Future    3. Osteoporosis, unspecified osteoporosis type, unspecified pathological fracture presence  -     METABOLIC PANEL, COMPREHENSIVE; Future  -     CBC W/O DIFF; Future  -     TSH 3RD GENERATION; Future    4. Bilateral carotid artery stenosis  -     atorvastatin (LIPITOR) 10 mg tablet; Take 1 Tablet by mouth daily. 5. Cerebral microvascular disease  -     atorvastatin (LIPITOR) 10 mg tablet; Take 1 Tablet by mouth daily. 6. Cervical post-laminectomy syndrome, 2009 Dr Beatris Bullock  -     atorvastatin (LIPITOR) 10 mg tablet; Take 1 Tablet by mouth daily. 7. Carpal tunnel syndrome of right wrist  -     atorvastatin (LIPITOR) 10 mg tablet; Take 1 Tablet by mouth daily. 8. Encounter for immunization  -     PNEUMOCOCCAL POLYSACCHARIDE VACCINE, 23-VALENT, ADULT OR IMMUNOSUPPRESSED PT DOSE,      Follow-up and Dispositions    · Return in about 1 year (around 11/4/2022) for medicare wellbess. This is the Subsequent Medicare Annual Wellness Exam, performed 12 months or more after the Initial AWV or the last Subsequent AWV    I have reviewed the patient's medical history in detail and updated the computerized patient record. Assessment/Plan   Education and counseling provided:  Are appropriate based on today's review and evaluation  Pneumococcal Vaccine-pneumovax 23 today  Influenza Vaccine  tdap and shingrix recommended    1. Needs flu shot  -     FLU (FLUAD QUAD INFLUENZA VACCINE,QUAD,ADJUVANTED)  2. Pure hypercholesterolemia  -     LIPID PANEL; Future  -     METABOLIC PANEL, COMPREHENSIVE; Future  -     CBC W/O DIFF; Future  -     TSH 3RD GENERATION; Future   Refill lipitor 10 mg qd  3. Osteoporosis, unspecified osteoporosis type, unspecified pathological fracture presence  -     METABOLIC PANEL, COMPREHENSIVE; Future  -     CBC W/O DIFF; Future  -     TSH 3RD GENERATION;  Future    dexa per Dr aKy Reis   On calcium and vit d    4. Cerebral microvascular dz   Aspirin and statin    Pt to sign release of medical records from Dr Reece Warner office    Depression Risk Factor Screening     3 most recent PHQ Screens 11/4/2021   Little interest or pleasure in doing things Not at all   Feeling down, depressed, irritable, or hopeless Not at all   Total Score PHQ 2 0       Alcohol Risk Screen    Do you average more than 1 drink per night or more than 7 drinks a week:  No    On any one occasion in the past three months have you have had more than 3 drinks containing alcohol:  No        Functional Ability and Level of Safety    Hearing: Hearing is good. Activities of Daily Living: The home contains: handrails and grab bars  Patient does total self care      Ambulation: with no difficulty     Fall Risk:  Fall Risk Assessment, last 12 mths 11/4/2021   Able to walk? Yes   Fall in past 12 months? 0   Do you feel unsteady?  0   Are you worried about falling 0      Abuse Screen:  Patient is not abused       Cognitive Screening    Has your family/caregiver stated any concerns about your memory: no     Cognitive Screening: Normal - serial 3    Health Maintenance Due     Health Maintenance Due   Topic Date Due    Hepatitis C Screening  Never done    Lipid Screen  Never done    DTaP/Tdap/Td series (1 - Tdap) Never done    Colorectal Cancer Screening Combo  Never done    Shingrix Vaccine Age 50> (1 of 2) Never done    Breast Cancer Screen Mammogram  04/30/2016    Bone Densitometry (Dexa) Screening  Never done    Pneumococcal 65+ years (1 of 1 - PPSV23) Never done    Flu Vaccine (1) Never done       Patient Care Team   Patient Care Team:  Dayna Miller MD as PCP - General (Internal Medicine)    History     Patient Active Problem List   Diagnosis Code    Cerebral microvascular disease I67.89    Cervical post-laminectomy syndrome, 2009 Dr Wasserman Needle M96.1    Carpal tunnel syndrome of right wrist G56.01     Past Medical History:   Diagnosis Date  Macular degeneration     Osteoporosis       Past Surgical History:   Procedure Laterality Date    HX ORTHOPAEDIC      2009: disc replaced neck    HX PARTIAL HYSTERECTOMY       Current Outpatient Medications   Medication Sig Dispense Refill    atorvastatin (LIPITOR) 10 mg tablet 10 mg daily.  estradiol (VAGIFEM) 10 mcg tab vaginal tablet 10 mcg two (2) times a week.  multivitamin (ONE A DAY) tablet Take 1 Tab by mouth daily.  calcium-cholecalciferol, D3, (CALTRATE 600+D) tablet Take 2 Tabs by mouth daily.  VIT C/E/ZN/COPPR/LUTEIN/ZEAXAN (PRESERVISION AREDS 2 PO) Take 2 Tabs by mouth daily.  aspirin delayed-release 81 mg tablet Take  by mouth daily.        No Known Allergies    Family History   Problem Relation Age of Onset    Heart Disease Mother     Stroke Mother     Hypertension Mother     Diabetes Mother     Cancer Father         unknown primary    Diabetes Sister     Obesity Sister     Heart Disease Brother     Hypertension Brother     Seizures Child     Migraines Child      Social History     Tobacco Use    Smoking status: Never Smoker    Smokeless tobacco: Never Used   Substance Use Topics    Alcohol use: No         Fior Johnston MD

## 2021-11-04 NOTE — PROGRESS NOTES
Jeffy Nichols is a 80 y.o. female who presents for routine immunizations. She denies any symptoms , reactions or allergies that would exclude them from being immunized today. Risks and adverse reactions were discussed and the VIS was given to them. All questions were addressed. She was observed for15 min post injection. There were no reactions observed.     Lisa Pierre LPN

## 2021-11-09 ENCOUNTER — APPOINTMENT (OUTPATIENT)
Dept: INTERNAL MEDICINE CLINIC | Age: 71
End: 2021-11-09

## 2021-11-09 ENCOUNTER — DOCUMENTATION ONLY (OUTPATIENT)
Dept: INTERNAL MEDICINE CLINIC | Age: 71
End: 2021-11-09

## 2021-11-09 DIAGNOSIS — M81.0 OSTEOPOROSIS, UNSPECIFIED OSTEOPOROSIS TYPE, UNSPECIFIED PATHOLOGICAL FRACTURE PRESENCE: ICD-10-CM

## 2021-11-09 DIAGNOSIS — E78.00 PURE HYPERCHOLESTEROLEMIA: ICD-10-CM

## 2021-11-09 LAB
ALBUMIN SERPL-MCNC: 4.1 G/DL (ref 3.5–5)
ALBUMIN/GLOB SERPL: 1.2 {RATIO} (ref 1.1–2.2)
ALP SERPL-CCNC: 59 U/L (ref 45–117)
ALT SERPL-CCNC: 27 U/L (ref 12–78)
ANION GAP SERPL CALC-SCNC: 5 MMOL/L (ref 5–15)
AST SERPL-CCNC: 23 U/L (ref 15–37)
BILIRUB SERPL-MCNC: 0.8 MG/DL (ref 0.2–1)
BUN SERPL-MCNC: 12 MG/DL (ref 6–20)
BUN/CREAT SERPL: 13 (ref 12–20)
CALCIUM SERPL-MCNC: 9.7 MG/DL (ref 8.5–10.1)
CHLORIDE SERPL-SCNC: 107 MMOL/L (ref 97–108)
CHOLEST SERPL-MCNC: 163 MG/DL
CO2 SERPL-SCNC: 29 MMOL/L (ref 21–32)
CREAT SERPL-MCNC: 0.92 MG/DL (ref 0.55–1.02)
ERYTHROCYTE [DISTWIDTH] IN BLOOD BY AUTOMATED COUNT: 13 % (ref 11.5–14.5)
GLOBULIN SER CALC-MCNC: 3.4 G/DL (ref 2–4)
GLUCOSE SERPL-MCNC: 92 MG/DL (ref 65–100)
HCT VFR BLD AUTO: 43.8 % (ref 35–47)
HDLC SERPL-MCNC: 59 MG/DL
HDLC SERPL: 2.8 {RATIO} (ref 0–5)
HGB BLD-MCNC: 14.3 G/DL (ref 11.5–16)
LDLC SERPL CALC-MCNC: 82.4 MG/DL (ref 0–100)
MCH RBC QN AUTO: 29.9 PG (ref 26–34)
MCHC RBC AUTO-ENTMCNC: 32.6 G/DL (ref 30–36.5)
MCV RBC AUTO: 91.6 FL (ref 80–99)
NRBC # BLD: 0 K/UL (ref 0–0.01)
NRBC BLD-RTO: 0 PER 100 WBC
PLATELET # BLD AUTO: 235 K/UL (ref 150–400)
PMV BLD AUTO: 11.1 FL (ref 8.9–12.9)
POTASSIUM SERPL-SCNC: 5.1 MMOL/L (ref 3.5–5.1)
PROT SERPL-MCNC: 7.5 G/DL (ref 6.4–8.2)
RBC # BLD AUTO: 4.78 M/UL (ref 3.8–5.2)
SODIUM SERPL-SCNC: 141 MMOL/L (ref 136–145)
TRIGL SERPL-MCNC: 108 MG/DL (ref ?–150)
TSH SERPL DL<=0.05 MIU/L-ACNC: 2.06 UIU/ML (ref 0.36–3.74)
VLDLC SERPL CALC-MCNC: 21.6 MG/DL
WBC # BLD AUTO: 5.4 K/UL (ref 3.6–11)

## 2021-11-09 NOTE — PROGRESS NOTES
Received release of medical records from patient on 11/4/21. Faxed records request to Shailesh Summers at [phone] on 11/9/21.

## 2022-03-18 PROBLEM — I67.89 CEREBRAL MICROVASCULAR DISEASE: Status: ACTIVE | Noted: 2018-03-15

## 2022-03-19 PROBLEM — M96.1 CERVICAL POST-LAMINECTOMY SYNDROME: Status: ACTIVE | Noted: 2018-03-15

## 2022-03-20 PROBLEM — G56.01 CARPAL TUNNEL SYNDROME OF RIGHT WRIST: Status: ACTIVE | Noted: 2018-03-15

## 2022-04-18 ENCOUNTER — PATIENT MESSAGE (OUTPATIENT)
Dept: INTERNAL MEDICINE CLINIC | Age: 72
End: 2022-04-18

## 2022-04-19 DIAGNOSIS — I65.23 BILATERAL CAROTID ARTERY STENOSIS: ICD-10-CM

## 2022-04-19 DIAGNOSIS — I67.89 CEREBRAL MICROVASCULAR DISEASE: ICD-10-CM

## 2022-04-19 DIAGNOSIS — M96.1 CERVICAL POST-LAMINECTOMY SYNDROME: ICD-10-CM

## 2022-04-19 DIAGNOSIS — G56.01 CARPAL TUNNEL SYNDROME OF RIGHT WRIST: ICD-10-CM

## 2022-04-19 RX ORDER — ATORVASTATIN CALCIUM 10 MG/1
10 TABLET, FILM COATED ORAL DAILY
Qty: 90 TABLET | Refills: 3 | Status: SHIPPED | OUTPATIENT
Start: 2022-04-19

## 2022-04-19 NOTE — TELEPHONE ENCOUNTER
PCP: Melissa Benitez MD    Last appt: 11/9/2021  Future Appointments   Date Time Provider Mike Duffy   11/9/2022  9:30 AM Melissa Benitez MD [facility] AMB       Requested Prescriptions     Pending Prescriptions Disp Refills    atorvastatin (LIPITOR) 10 mg tablet 90 Tablet 3     Sig: Take 1 Tablet by mouth daily.        Prior labs and Blood pressures:  BP Readings from Last 3 Encounters:   11/04/21 110/60   09/29/18 136/70   03/15/18 124/78     Lab Results   Component Value Date/Time    Sodium 141 11/09/2021 09:56 AM    Potassium 5.1 11/09/2021 09:56 AM    Chloride 107 11/09/2021 09:56 AM    CO2 29 11/09/2021 09:56 AM    Anion gap 5 11/09/2021 09:56 AM    Glucose 92 11/09/2021 09:56 AM    BUN 12 11/09/2021 09:56 AM    Creatinine 0.92 11/09/2021 09:56 AM    BUN/Creatinine ratio 13 11/09/2021 09:56 AM    GFR est AA >60 11/09/2021 09:56 AM    GFR est non-AA >60 11/09/2021 09:56 AM    Calcium 9.7 11/09/2021 09:56 AM     No results found for: HBA1C, FZX3STWT, BCS6VDCZ  Lab Results   Component Value Date/Time    Cholesterol, total 163 11/09/2021 09:56 AM    HDL Cholesterol 59 11/09/2021 09:56 AM    LDL, calculated 82.4 11/09/2021 09:56 AM    VLDL, calculated 21.6 11/09/2021 09:56 AM    Triglyceride 108 11/09/2021 09:56 AM    CHOL/HDL Ratio 2.8 11/09/2021 09:56 AM     No results found for: FRANK Holden    Lab Results   Component Value Date/Time    TSH 2.06 11/09/2021 09:56 AM

## 2022-04-19 NOTE — TELEPHONE ENCOUNTER
Caller requests Refill of:  atorvastatin (LIPITOR) 10 mg tablet      Please send to:  [facility] #[id number]- [address], [address] [place], [place] [address]  [phone]      Visit Appointment History:   Future:   11/9/22  Previous: 11/4/21          Caller was advised that Meds will be refilled as soon as possible, however there can be a 48-72 business hour turn around on refill requests.

## 2022-07-25 LAB — MAMMOGRAPHY, EXTERNAL: NORMAL

## 2022-11-09 ENCOUNTER — OFFICE VISIT (OUTPATIENT)
Dept: INTERNAL MEDICINE CLINIC | Age: 72
End: 2022-11-09
Payer: MEDICARE

## 2022-11-09 VITALS
BODY MASS INDEX: 25.52 KG/M2 | RESPIRATION RATE: 16 BRPM | WEIGHT: 153.2 LBS | OXYGEN SATURATION: 99 % | HEIGHT: 65 IN | DIASTOLIC BLOOD PRESSURE: 85 MMHG | HEART RATE: 67 BPM | SYSTOLIC BLOOD PRESSURE: 126 MMHG | TEMPERATURE: 97.3 F

## 2022-11-09 DIAGNOSIS — E78.5 HYPERLIPIDEMIA, UNSPECIFIED HYPERLIPIDEMIA TYPE: Primary | ICD-10-CM

## 2022-11-09 DIAGNOSIS — Z23 ENCOUNTER FOR IMMUNIZATION: ICD-10-CM

## 2022-11-09 DIAGNOSIS — M81.0 OSTEOPOROSIS, UNSPECIFIED OSTEOPOROSIS TYPE, UNSPECIFIED PATHOLOGICAL FRACTURE PRESENCE: ICD-10-CM

## 2022-11-09 DIAGNOSIS — Z11.59 ENCOUNTER FOR HEPATITIS C SCREENING TEST FOR LOW RISK PATIENT: ICD-10-CM

## 2022-11-09 PROCEDURE — 3017F COLORECTAL CA SCREEN DOC REV: CPT | Performed by: INTERNAL MEDICINE

## 2022-11-09 PROCEDURE — G8427 DOCREV CUR MEDS BY ELIG CLIN: HCPCS | Performed by: INTERNAL MEDICINE

## 2022-11-09 PROCEDURE — G9899 SCRN MAM PERF RSLTS DOC: HCPCS | Performed by: INTERNAL MEDICINE

## 2022-11-09 PROCEDURE — G8417 CALC BMI ABV UP PARAM F/U: HCPCS | Performed by: INTERNAL MEDICINE

## 2022-11-09 PROCEDURE — G8510 SCR DEP NEG, NO PLAN REQD: HCPCS | Performed by: INTERNAL MEDICINE

## 2022-11-09 PROCEDURE — G0439 PPPS, SUBSEQ VISIT: HCPCS | Performed by: INTERNAL MEDICINE

## 2022-11-09 PROCEDURE — G8536 NO DOC ELDER MAL SCRN: HCPCS | Performed by: INTERNAL MEDICINE

## 2022-11-09 PROCEDURE — 90694 VACC AIIV4 NO PRSRV 0.5ML IM: CPT | Performed by: INTERNAL MEDICINE

## 2022-11-09 PROCEDURE — 1101F PT FALLS ASSESS-DOCD LE1/YR: CPT | Performed by: INTERNAL MEDICINE

## 2022-11-09 NOTE — PATIENT INSTRUCTIONS
Medicare Wellness Visit, Female     The best way to live healthy is to have a lifestyle where you eat a well-balanced diet, exercise regularly, limit alcohol use, and quit all forms of tobacco/nicotine, if applicable. Regular preventive services are another way to keep healthy. Preventive services (vaccines, screening tests, monitoring & exams) can help personalize your care plan, which helps you manage your own care. Screening tests can find health problems at the earliest stages, when they are easiest to treat. [facility] follows the current, evidence-based guidelines published by the [facility] Baez ([facility]) when recommending preventive services for our patients. Because we follow these guidelines, sometimes recommendations change over time as research supports it. (For example, mammograms used to be recommended annually. Even though Medicare will still pay for an annual mammogram, the newer guidelines recommend a mammogram every two years for women of average risk). Of course, you and your doctor may decide to screen more often for some diseases, based on your risk and your co-morbidities (chronic disease you are already diagnosed with). Preventive services for you include:  - Medicare offers their members a free annual wellness visit, which is time for you and your primary care provider to discuss and plan for your preventive service needs. Take advantage of this benefit every year!  -All adults over the age of 72 should receive the recommended pneumonia vaccines. Current USPSTF guidelines recommend a series of two vaccines for the best pneumonia protection.   -All adults should have a flu vaccine yearly and a tetanus vaccine every 10 years.   -All adults age 48 and older should receive the shingles vaccines (series of two vaccines).       -All adults age 38-68 who are overweight should have a diabetes screening test once every three years.   -All adults born between [birth year] and 1964 should be screened once for Hepatitis C.  -Other screening tests and preventive services for persons with diabetes include: an eye exam to screen for diabetic retinopathy, a kidney function test, a foot exam, and stricter control over your cholesterol.   -Cardiovascular screening for adults with routine risk involves an electrocardiogram (ECG) at intervals determined by your doctor.   -Colorectal cancer screenings should be done for adults age 54-65 with no increased risk factors for colorectal cancer. There are a number of acceptable methods of screening for this type of cancer. Each test has its own benefits and drawbacks. Discuss with your doctor what is most appropriate for you during your annual wellness visit. The different tests include: colonoscopy (considered the best screening method), a fecal occult blood test, a fecal DNA test, and sigmoidoscopy.    -A bone mass density test is recommended when a woman turns 65 to screen for osteoporosis. This test is only recommended one time, as a screening. Some providers will use this same test as a disease monitoring tool if you already have osteoporosis. -Breast cancer screenings are recommended every other year for women of normal risk, age 54-69.  -Cervical cancer screenings for women over age 72 are only recommended with certain risk factors.      Here is a list of your current Health Maintenance items (your personalized list of preventive services) with a due date:  Health Maintenance Due   Topic Date Due    Hepatitis C Test  Never done    DTaP/Tdap/Td  (1 - Tdap) Never done    Colorectal Screening  Never done    Shingles Vaccine (1 of 2) Never done    Bone Mineral Density   Never done    COVID-19 Vaccine (3 - Booster for Verle Mari series) 06/04/2021    Depresssion Screening  11/04/2022    Cholesterol Test   11/09/2022

## 2022-11-09 NOTE — PROGRESS NOTES
1. \"Have you been to the ER, urgent care clinic since your last visit? Hospitalized since your last visit? \" No    2. \"Have you seen or consulted any other health care providers outside of the [address] [place] [address] since your last visit? \" No     3. For patients aged 39-70: Has the patient had a colonoscopy / FIT/ Cologuard? Yes - no Care Gap present  Has one scheduled. If the patient is female:    4. For patients aged 41-77: Has the patient had a mammogram within the past 2 years? Yes - no Care Gap present      5. For patients aged 21-65: Has the patient had a pap smear?  NA - based on age or sex

## 2022-11-09 NOTE — PROGRESS NOTES
HISTORY OF PRESENT ILLNESS  Nisreen Golden is a 71 y.o. female. HPI  Fu  HLD osteoporosis , post laminectomy syndrome hx skin cancer on nose and medicare wellness---  Gyn Dr Anthony Kehr dexa--improved per pt on last this year- on calcium and vit d  She has mild urine incontinence she has d/w Dr Tennille Cortes at [facility] this year  Last Colonoscopy -Dr Lala Culp---not due per pt  Last LDL 80  DERM MD-routine check yearly    Had covid in January and still has some loss of small and taste  Active and walks for exercise  No cp or sob  Nonsmoler , no eoth  Last OV  Pt here to establish care     Former PCP-Dr HANY Garcia Short records       Had lipids checked this year -some dizziness so lowered to 10 mg every day at Dr Demetrius [facility] office     Had soto for microvascular dx a few years ago--normal carotid dopplers. Normal for age per Neuro notes  FH CVA--mother     Pt exercises and walks several days per week  Feels well overall     Gyn  MD Dr Froy Grewal normalized and stopped fosamax 2yrs after taking for about 3 yrs     Had colonoscopy < 10 yrs ago with Dr Yuliya Avendano per pt--no polyps     Sees ROE LEUNG--hx skin cancer on nose removed  Sees eye MD for mild macular degeneration       Patient Active Problem List    Diagnosis Date Noted    Cerebral microvascular disease 03/15/2018    Cervical post-laminectomy syndrome, 2009 Dr Yovana Yang 03/15/2018    Carpal tunnel syndrome of right wrist 03/15/2018     Current Outpatient Medications   Medication Sig Dispense Refill    atorvastatin (LIPITOR) 10 mg tablet Take 1 Tablet by mouth daily. 90 Tablet 3    estradiol (VAGIFEM) 10 mcg tab vaginal tablet 10 mcg two (2) times a week. multivitamin (ONE A DAY) tablet Take 1 Tab by mouth daily. calcium-cholecalciferol, D3, (CALTRATE 600+D) tablet Take 2 Tabs by mouth daily. VIT C/E/ZN/COPPR/LUTEIN/ZEAXAN (PRESERVISION AREDS 2 PO) Take 2 Tabs by mouth daily. aspirin delayed-release 81 mg tablet Take  by mouth daily.        No Known Allergies  Social History     Tobacco Use    Smoking status: Never    Smokeless tobacco: Never   Substance Use Topics    Alcohol use: No      Lab Results   Component Value Date/Time    WBC 5.4 11/09/2021 09:56 AM    HGB 14.3 11/09/2021 09:56 AM    HCT 43.8 11/09/2021 09:56 AM    PLATELET 149 85/98/4501 09:56 AM    MCV 91.6 11/09/2021 09:56 AM     Lab Results   Component Value Date/Time    Glucose 92 11/09/2021 09:56 AM    LDL, calculated 82.4 11/09/2021 09:56 AM    Creatinine 0.92 11/09/2021 09:56 AM      Lab Results   Component Value Date/Time    Cholesterol, total 163 11/09/2021 09:56 AM    HDL Cholesterol 59 11/09/2021 09:56 AM    LDL, calculated 82.4 11/09/2021 09:56 AM    Triglyceride 108 11/09/2021 09:56 AM    CHOL/HDL Ratio 2.8 11/09/2021 09:56 AM     Lab Results   Component Value Date/Time    ALT (SGPT) 27 11/09/2021 09:56 AM    Alk. phosphatase 59 11/09/2021 09:56 AM    Bilirubin, total 0.8 11/09/2021 09:56 AM    Albumin 4.1 11/09/2021 09:56 AM    Protein, total 7.5 11/09/2021 09:56 AM    INR 1.0 09/29/2018 07:46 PM    Prothrombin time 9.9 09/29/2018 07:46 PM    PLATELET 105 58/58/0781 09:56 AM       Lab Results   Component Value Date/Time    GFR est non-AA >60 11/09/2021 09:56 AM    GFR est AA >60 11/09/2021 09:56 AM    Creatinine 0.92 11/09/2021 09:56 AM    BUN 12 11/09/2021 09:56 AM    Sodium 141 11/09/2021 09:56 AM    Potassium 5.1 11/09/2021 09:56 AM    Chloride 107 11/09/2021 09:56 AM    CO2 29 11/09/2021 09:56 AM     Lab Results   Component Value Date/Time    TSH 2.06 11/09/2021 09:56 AM         ROS    Physical Exam  Vitals and nursing note reviewed. Constitutional:       Appearance: Normal appearance. She is well-developed. Comments: Appears stated age   HENT:      Head: Normocephalic and atraumatic. Nose: Nose normal.      Mouth/Throat:      Mouth: Mucous membranes are moist.   Eyes:      Pupils: Pupils are equal, round, and reactive to light. Neck:      Vascular: No carotid bruit. Cardiovascular:      Rate and Rhythm: Normal rate and regular rhythm. Heart sounds: Normal heart sounds. No murmur heard. No friction rub. No gallop. Pulmonary:      Effort: Pulmonary effort is normal. No respiratory distress. Breath sounds: Normal breath sounds. No wheezing. Abdominal:      General: Bowel sounds are normal.      Palpations: Abdomen is soft. Musculoskeletal:      Right lower leg: No edema. Left lower leg: No edema. Lymphadenopathy:      Cervical: No cervical adenopathy. Skin:     General: Skin is warm. Neurological:      General: No focal deficit present. Mental Status: She is alert. Psychiatric:         Mood and Affect: Mood normal.         Behavior: Behavior normal.         Thought Content: Thought content normal.         Judgment: Judgment normal.       ASSESSMENT and PLAN    ICD-10-CM ICD-9-CM    1. Hyperlipidemia, unspecified hyperlipidemia type  E78.5 272.4 CBC W/O DIFF      METABOLIC PANEL, COMPREHENSIVE      LIPID PANEL      TSH 3RD GENERATION      2. Osteoporosis, unspecified osteoporosis type, unspecified pathological fracture presence  M81.0 733.00 CBC W/O DIFF      METABOLIC PANEL, COMPREHENSIVE      3. Encounter for hepatitis C screening test for low risk patient  Z11.59 V73.89 HEPATITIS C AB      4. Encounter for immunization  Z23 V03.89 INFLUENZA, FLUAD, (AGE 72 Y+), IM, PF, 0.5 ML      This is the Subsequent Medicare Annual Wellness Exam, performed 12 months or more after the Initial AWV or the last Subsequent AWV    I have reviewed the patient's medical history in detail and updated the computerized patient record. Assessment/Plan   Education and counseling provided:  Are appropriate based on today's review and evaluation  End-of-Life planning (with patient's consent)  Influenza Vaccine  Tdap and shingrix recommended  Will request last Colonoscopy report from GI Dr David Phan    1.  Hyperlipidemia, unspecified hyperlipidemia type  -     CBC W/O DIFF; Future  -     METABOLIC PANEL, COMPREHENSIVE; Future  -     LIPID PANEL; Future  -     TSH 3RD GENERATION; Future   On statin  2. Osteoporosis, unspecified osteoporosis type, unspecified pathological fracture presence  -     CBC W/O DIFF; Future  -     METABOLIC PANEL, COMPREHENSIVE; Future   Dexa was done this year by gyn MD-improved per pt on calcium and vit d  3. Encounter for hepatitis C screening test for low risk patient  -     HEPATITIS C AB; Future  4. Encounter for immunization  -     INFLUENZA, FLUAD, (AGE 72 Y+), IM, PF, 0.5 ML       Depression Risk Factor Screening     3 most recent PHQ Screens 11/9/2022   Little interest or pleasure in doing things Not at all   Feeling down, depressed, irritable, or hopeless Not at all   Total Score PHQ 2 0       Alcohol & Drug Abuse Risk Screen    Do you average more than 1 drink per night or more than 7 drinks a week:  No    On any one occasion in the past three months have you have had more than 3 drinks containing alcohol:  No          Functional Ability and Level of Safety    Hearing: Hearing is good. Activities of Daily Living: The home contains: handrails and grab bars  Patient does total self care      Ambulation: with no difficulty     Fall Risk:  Fall Risk Assessment, last 12 mths 11/9/2022   Able to walk? Yes   Fall in past 12 months? 0   Do you feel unsteady?  0   Are you worried about falling 0      Abuse Screen:  Patient is not abused       Cognitive Screening    Has your family/caregiver stated any concerns about your memory: no     Cognitive Screening:      Health Maintenance Due     Health Maintenance Due   Topic Date Due    Hepatitis C Screening  Never done    DTaP/Tdap/Td series (1 - Tdap) Never done    Colorectal Cancer Screening Combo  Never done    Shingrix Vaccine Age 50> (1 of 2) Never done    Bone Densitometry (Dexa) Screening  Never done    COVID-19 Vaccine (3 - Booster for Tea Most series) 06/04/2021    Depression Screen  11/04/2022 Lipid Screen  11/09/2022       Patient Care Team   Patient Care Team:  Aracelis Meeks MD as PCP - General (Internal Medicine Physician)  Aracelis Meeks MD as PCP - [facility]HABILITATION [facility]    History     Patient Active Problem List   Diagnosis Code    Cerebral microvascular disease I67.89    Cervical post-laminectomy syndrome, 2009 Dr Fabien Wolfe M96.1    Carpal tunnel syndrome of right wrist G56.01     Past Medical History:   Diagnosis Date    Macular degeneration     Osteoporosis       Past Surgical History:   Procedure Laterality Date    HX ORTHOPAEDIC      2009: disc replaced neck    HX PARTIAL HYSTERECTOMY       Current Outpatient Medications   Medication Sig Dispense Refill    atorvastatin (LIPITOR) 10 mg tablet Take 1 Tablet by mouth daily. 90 Tablet 3    estradiol (VAGIFEM) 10 mcg tab vaginal tablet 10 mcg two (2) times a week. multivitamin (ONE A DAY) tablet Take 1 Tab by mouth daily. calcium-cholecalciferol, D3, (CALTRATE 600+D) tablet Take 2 Tabs by mouth daily. VIT C/E/ZN/COPPR/LUTEIN/ZEAXAN (PRESERVISION AREDS 2 PO) Take 2 Tabs by mouth daily. aspirin delayed-release 81 mg tablet Take  by mouth daily.        No Known Allergies    Family History   Problem Relation Age of Onset    Heart Disease Mother     Stroke Mother     Hypertension Mother     Diabetes Mother     Cancer Father         unknown primary    Diabetes Sister     Obesity Sister     Heart Disease Brother     Hypertension Brother     Seizures Child     Migraines Child      Social History     Tobacco Use    Smoking status: Never    Smokeless tobacco: Never   Substance Use Topics    Alcohol use: No         Nikhil Chapman MD

## 2022-11-09 NOTE — LETTER
11/9/2022 9:55 AM    [address] [facility] [address]      Dear Care Provider    Please fax us the most recent colonoscopy so that we may update the patient's records for continuity of care. Our fax number: [phone]. Patient:    Sarah Franco  1950        Sincerely,      Luci Colorado MD

## 2022-11-10 LAB
ALBUMIN SERPL-MCNC: 3.9 G/DL (ref 3.5–5)
ALBUMIN/GLOB SERPL: 1.3 {RATIO} (ref 1.1–2.2)
ALP SERPL-CCNC: 58 U/L (ref 45–117)
ALT SERPL-CCNC: 24 U/L (ref 12–78)
ANION GAP SERPL CALC-SCNC: 5 MMOL/L (ref 5–15)
AST SERPL-CCNC: 20 U/L (ref 15–37)
BILIRUB SERPL-MCNC: 0.8 MG/DL (ref 0.2–1)
BUN SERPL-MCNC: 13 MG/DL (ref 6–20)
BUN/CREAT SERPL: 16 (ref 12–20)
CALCIUM SERPL-MCNC: 9 MG/DL (ref 8.5–10.1)
CHLORIDE SERPL-SCNC: 109 MMOL/L (ref 97–108)
CHOLEST SERPL-MCNC: 155 MG/DL
CO2 SERPL-SCNC: 28 MMOL/L (ref 21–32)
CREAT SERPL-MCNC: 0.82 MG/DL (ref 0.55–1.02)
ERYTHROCYTE [DISTWIDTH] IN BLOOD BY AUTOMATED COUNT: 13.2 % (ref 11.5–14.5)
GLOBULIN SER CALC-MCNC: 3.1 G/DL (ref 2–4)
GLUCOSE SERPL-MCNC: 97 MG/DL (ref 65–100)
HCT VFR BLD AUTO: 43.5 % (ref 35–47)
HCV AB SERPL QL IA: NONREACTIVE
HDLC SERPL-MCNC: 59 MG/DL
HDLC SERPL: 2.6 {RATIO} (ref 0–5)
HGB BLD-MCNC: 14 G/DL (ref 11.5–16)
LDLC SERPL CALC-MCNC: 75.2 MG/DL (ref 0–100)
MCH RBC QN AUTO: 29.7 PG (ref 26–34)
MCHC RBC AUTO-ENTMCNC: 32.2 G/DL (ref 30–36.5)
MCV RBC AUTO: 92.4 FL (ref 80–99)
NRBC # BLD: 0 K/UL (ref 0–0.01)
NRBC BLD-RTO: 0 PER 100 WBC
PLATELET # BLD AUTO: 229 K/UL (ref 150–400)
PMV BLD AUTO: 10.9 FL (ref 8.9–12.9)
POTASSIUM SERPL-SCNC: 4.5 MMOL/L (ref 3.5–5.1)
PROT SERPL-MCNC: 7 G/DL (ref 6.4–8.2)
RBC # BLD AUTO: 4.71 M/UL (ref 3.8–5.2)
SODIUM SERPL-SCNC: 142 MMOL/L (ref 136–145)
TRIGL SERPL-MCNC: 104 MG/DL (ref ?–150)
TSH SERPL DL<=0.05 MIU/L-ACNC: 2.22 UIU/ML (ref 0.36–3.74)
VLDLC SERPL CALC-MCNC: 20.8 MG/DL
WBC # BLD AUTO: 5.1 K/UL (ref 3.6–11)

## 2022-11-17 NOTE — ADDENDUM NOTE
Addended by: Johnathon Tracy on: 11/9/2022 10:28 AM     Modules accepted: Orders Nando Patel MD  Performing Provider: Nando Patel Procedure performed by Dr. Patel.

## 2023-04-25 DIAGNOSIS — I65.23 BILATERAL CAROTID ARTERY STENOSIS: ICD-10-CM

## 2023-04-25 DIAGNOSIS — G56.01 CARPAL TUNNEL SYNDROME OF RIGHT WRIST: ICD-10-CM

## 2023-04-25 DIAGNOSIS — M96.1 CERVICAL POST-LAMINECTOMY SYNDROME: ICD-10-CM

## 2023-04-25 DIAGNOSIS — I67.89 CEREBRAL MICROVASCULAR DISEASE: ICD-10-CM

## 2023-04-25 RX ORDER — ATORVASTATIN CALCIUM 10 MG/1
10 TABLET, FILM COATED ORAL DAILY
Qty: 90 TABLET | Refills: 3 | Status: SHIPPED | OUTPATIENT
Start: 2023-04-25

## 2023-11-09 PROBLEM — M85.80 OSTEOPENIA: Status: ACTIVE | Noted: 2023-11-09

## 2023-11-09 PROBLEM — E78.5 HLD (HYPERLIPIDEMIA): Status: ACTIVE | Noted: 2023-11-09

## 2023-11-10 ENCOUNTER — OFFICE VISIT (OUTPATIENT)
Age: 73
End: 2023-11-10
Payer: MEDICARE

## 2023-11-10 VITALS
BODY MASS INDEX: 24.66 KG/M2 | SYSTOLIC BLOOD PRESSURE: 110 MMHG | WEIGHT: 148 LBS | OXYGEN SATURATION: 98 % | TEMPERATURE: 97 F | RESPIRATION RATE: 16 BRPM | HEART RATE: 80 BPM | DIASTOLIC BLOOD PRESSURE: 76 MMHG | HEIGHT: 65 IN

## 2023-11-10 DIAGNOSIS — Z23 ENCOUNTER FOR IMMUNIZATION: Primary | ICD-10-CM

## 2023-11-10 DIAGNOSIS — M85.80 OSTEOPENIA, UNSPECIFIED LOCATION: ICD-10-CM

## 2023-11-10 DIAGNOSIS — Z12.11 COLON CANCER SCREENING: ICD-10-CM

## 2023-11-10 DIAGNOSIS — E78.00 PURE HYPERCHOLESTEROLEMIA: ICD-10-CM

## 2023-11-10 DIAGNOSIS — Z00.00 MEDICARE ANNUAL WELLNESS VISIT, SUBSEQUENT: ICD-10-CM

## 2023-11-10 LAB
ALBUMIN SERPL-MCNC: 3.9 G/DL (ref 3.5–5)
ALBUMIN/GLOB SERPL: 1.3 (ref 1.1–2.2)
ALP SERPL-CCNC: 60 U/L (ref 45–117)
ALT SERPL-CCNC: 21 U/L (ref 12–78)
ANION GAP SERPL CALC-SCNC: 5 MMOL/L (ref 5–15)
AST SERPL-CCNC: 18 U/L (ref 15–37)
BILIRUB SERPL-MCNC: 1.1 MG/DL (ref 0.2–1)
BUN SERPL-MCNC: 13 MG/DL (ref 6–20)
BUN/CREAT SERPL: 14 (ref 12–20)
CALCIUM SERPL-MCNC: 9.3 MG/DL (ref 8.5–10.1)
CHLORIDE SERPL-SCNC: 106 MMOL/L (ref 97–108)
CHOLEST SERPL-MCNC: 147 MG/DL
CO2 SERPL-SCNC: 29 MMOL/L (ref 21–32)
CREAT SERPL-MCNC: 0.94 MG/DL (ref 0.55–1.02)
ERYTHROCYTE [DISTWIDTH] IN BLOOD BY AUTOMATED COUNT: 12.6 % (ref 11.5–14.5)
GLOBULIN SER CALC-MCNC: 3.1 G/DL (ref 2–4)
GLUCOSE SERPL-MCNC: 92 MG/DL (ref 65–100)
HCT VFR BLD AUTO: 41.6 % (ref 35–47)
HDLC SERPL-MCNC: 57 MG/DL
HDLC SERPL: 2.6 (ref 0–5)
HGB BLD-MCNC: 13.7 G/DL (ref 11.5–16)
LDLC SERPL CALC-MCNC: 68 MG/DL (ref 0–100)
MCH RBC QN AUTO: 29.9 PG (ref 26–34)
MCHC RBC AUTO-ENTMCNC: 32.9 G/DL (ref 30–36.5)
MCV RBC AUTO: 90.8 FL (ref 80–99)
NRBC # BLD: 0 K/UL (ref 0–0.01)
NRBC BLD-RTO: 0 PER 100 WBC
PLATELET # BLD AUTO: 299 K/UL (ref 150–400)
PMV BLD AUTO: 10.3 FL (ref 8.9–12.9)
POTASSIUM SERPL-SCNC: 4.7 MMOL/L (ref 3.5–5.1)
PROT SERPL-MCNC: 7 G/DL (ref 6.4–8.2)
RBC # BLD AUTO: 4.58 M/UL (ref 3.8–5.2)
SODIUM SERPL-SCNC: 140 MMOL/L (ref 136–145)
TRIGL SERPL-MCNC: 110 MG/DL
TSH SERPL DL<=0.05 MIU/L-ACNC: 2.24 UIU/ML (ref 0.36–3.74)
VLDLC SERPL CALC-MCNC: 22 MG/DL
WBC # BLD AUTO: 5 K/UL (ref 3.6–11)

## 2023-11-10 PROCEDURE — 90677 PCV20 VACCINE IM: CPT | Performed by: INTERNAL MEDICINE

## 2023-11-10 PROCEDURE — 99213 OFFICE O/P EST LOW 20 MIN: CPT | Performed by: INTERNAL MEDICINE

## 2023-11-10 PROCEDURE — 90694 VACC AIIV4 NO PRSRV 0.5ML IM: CPT | Performed by: INTERNAL MEDICINE

## 2023-11-10 RX ORDER — IBUPROFEN 800 MG
1 TABLET ORAL
COMMUNITY

## 2023-11-10 RX ORDER — MULTIVIT WITH IRON,MINERALS
LIQUID (ML) ORAL DAILY
COMMUNITY

## 2023-11-10 SDOH — ECONOMIC STABILITY: HOUSING INSECURITY
IN THE LAST 12 MONTHS, WAS THERE A TIME WHEN YOU DID NOT HAVE A STEADY PLACE TO SLEEP OR SLEPT IN A SHELTER (INCLUDING NOW)?: NO

## 2023-11-10 SDOH — ECONOMIC STABILITY: FOOD INSECURITY: WITHIN THE PAST 12 MONTHS, THE FOOD YOU BOUGHT JUST DIDN'T LAST AND YOU DIDN'T HAVE MONEY TO GET MORE.: NEVER TRUE

## 2023-11-10 SDOH — ECONOMIC STABILITY: INCOME INSECURITY: HOW HARD IS IT FOR YOU TO PAY FOR THE VERY BASICS LIKE FOOD, HOUSING, MEDICAL CARE, AND HEATING?: NOT HARD AT ALL

## 2023-11-10 SDOH — ECONOMIC STABILITY: FOOD INSECURITY: WITHIN THE PAST 12 MONTHS, YOU WORRIED THAT YOUR FOOD WOULD RUN OUT BEFORE YOU GOT MONEY TO BUY MORE.: NEVER TRUE

## 2023-11-10 ASSESSMENT — PATIENT HEALTH QUESTIONNAIRE - PHQ9
SUM OF ALL RESPONSES TO PHQ9 QUESTIONS 1 & 2: 0
SUM OF ALL RESPONSES TO PHQ QUESTIONS 1-9: 0
2. FEELING DOWN, DEPRESSED OR HOPELESS: 0
SUM OF ALL RESPONSES TO PHQ QUESTIONS 1-9: 0
1. LITTLE INTEREST OR PLEASURE IN DOING THINGS: 0
SUM OF ALL RESPONSES TO PHQ QUESTIONS 1-9: 0
SUM OF ALL RESPONSES TO PHQ QUESTIONS 1-9: 0

## 2023-11-10 ASSESSMENT — LIFESTYLE VARIABLES
HOW MANY STANDARD DRINKS CONTAINING ALCOHOL DO YOU HAVE ON A TYPICAL DAY: PATIENT DOES NOT DRINK
HOW OFTEN DO YOU HAVE A DRINK CONTAINING ALCOHOL: NEVER

## 2023-11-10 NOTE — PATIENT INSTRUCTIONS
Advance Directives: Care Instructions  Overview  An advance directive is a legal way to state your wishes at the end of your life. It tells your family and your doctor what to do if you can't say what you want. There are two main types of advance directives. You can change them any time your wishes change. Living will. This form tells your family and your doctor your wishes about life support and other treatment. The form is also called a declaration. Medical power of . This form lets you name a person to make treatment decisions for you when you can't speak for yourself. This person is called a health care agent (health care proxy, health care surrogate). The form is also called a durable power of  for health care. If you do not have an advance directive, decisions about your medical care may be made by a family member, or by a doctor or a  who doesn't know you. It may help to think of an advance directive as a gift to the people who care for you. If you have one, they won't have to make tough decisions by themselves. For more information, including forms for your state, see the [address] [place] [address] website (www.caringinfo.org/planning/advance-directives/). Follow-up care is a key part of your treatment and safety. Be sure to make and go to all appointments, and call your doctor if you are having problems. It's also a good idea to know your test results and keep a list of the medicines you take. What should you include in an advance directive? Many states have a unique advance directive form. (It may ask you to address specific issues.) Or you might use a universal form that's approved by many states. If your form doesn't tell you what to address, it may be hard to know what to include in your advance directive. Use the questions below to help you get started. Who do you want to make decisions about your medical care if you are not able to?   What life-support measures do you want if you

## 2024-04-12 RX ORDER — ATORVASTATIN CALCIUM 10 MG/1
10 TABLET, FILM COATED ORAL DAILY
Qty: 90 TABLET | Refills: 2 | Status: SHIPPED | OUTPATIENT
Start: 2024-04-12

## 2024-04-12 NOTE — TELEPHONE ENCOUNTER
----- Message from Isabel Solano sent at 4/12/2024  9:38 AM EDT -----  Regarding: Atorvastatin  Contact: [phone]  I have changed my pharmacy from [facility] to [facility]. Would you please send a 90 day refill of Atorvastatin to [facility]. Thank you.

## 2024-04-12 NOTE — TELEPHONE ENCOUNTER
PCP: Anatoliy Flores MD    Last appt:   11/10/2023    No future appointments.    Requested Prescriptions     Pending Prescriptions Disp Refills    atorvastatin (LIPITOR) 10 MG tablet 90 tablet 2     Sig: Take 1 tablet by mouth daily

## 2024-04-27 DIAGNOSIS — I65.23 OCCLUSION AND STENOSIS OF BILATERAL CAROTID ARTERIES: ICD-10-CM

## 2024-04-27 DIAGNOSIS — I67.89 OTHER CEREBROVASCULAR DISEASE: ICD-10-CM

## 2024-04-29 RX ORDER — ATORVASTATIN CALCIUM 10 MG/1
10 TABLET, FILM COATED ORAL DAILY
Qty: 90 TABLET | Refills: 3 | Status: SHIPPED | OUTPATIENT
Start: 2024-04-29

## 2024-10-09 ENCOUNTER — TELEPHONE (OUTPATIENT)
Dept: OTHER | Facility: CLINIC | Age: 74
End: 2024-10-09

## 2024-11-08 ENCOUNTER — HOSPITAL ENCOUNTER (OUTPATIENT)
Facility: HOSPITAL | Age: 74
Setting detail: RECURRING SERIES
Discharge: HOME | End: 2024-11-11
Payer: MEDICARE

## 2024-11-08 PROCEDURE — 97110 THERAPEUTIC EXERCISES: CPT | Performed by: PHYSICAL THERAPIST

## 2024-11-08 PROCEDURE — 97162 PT EVAL MOD COMPLEX 30 MIN: CPT | Performed by: PHYSICAL THERAPIST

## 2024-11-08 NOTE — THERAPY EVALUATION
[facility]  [address]  [place] [address]  Phone: [phone]   Fax: [phone]          PHYSICAL THERAPY - MEDICARE EVALUATION/PLAN OF CARE NOTE (updated 3/23)      Date: 2024          Patient Name:  Isabel Solano :  1950   Medical   Diagnosis:  Spondylosis without myelopathy or radiculopathy, cervical region [M47.812]  Pain in right arm [M79.601] Treatment Diagnosis:  M25.511  RIGHT SHOULDER PAIN    Referral Source:  Tonio Bhardwaj MD Provider #:  [id number]                Insurance: Payor: MEDICARE / Plan: MEDICARE PART A AND B / Product Type: *No Product type* /      Patient  verified yes     Visit #   Current  / Total 1 24   Time   In / Out 9:43am 10:40am   Total Treatment Time 57   Total Timed Codes 25   1:1 Treatment Time 25      [facility] Totals Reminder:  bill using total billable   min of TIMED therapeutic procedures and modalities.   8-22 min = 1 unit; 23-37 min = 2 units; 38-52 min = 3 units;  53-67 min = 4 units; 68-82 min = 5 units           SUBJECTIVE  Pain Level (0-10 scale): at rest/with movement: 1/4 (1-9/10)  []constant []intermittent []improving []worsening []no change since onset    Any medication changes, allergies to medications, adverse drug reactions, diagnosis change, or new procedure performed?: [x] No    [] Yes (see summary sheet for update)  Medications: Verified on Patient Summary List    Subjective functional status/changes:     The patient had a steroid dosepack which helped a lot but it's still there. It would hurt in the upper arm and go down past the elbow. No numbness or tingling. It started at least a year ago and gotten worse. She notices it more at night, but she has difficulty reaching overhead. She feels about 30% to 100% (how she felt 2 years ago). It will occasionally wake her up at night (4-5x/wk prior to the dosepack). Sitting in the recliner for 30 minutes

## 2024-11-13 ENCOUNTER — HOSPITAL ENCOUNTER (OUTPATIENT)
Facility: HOSPITAL | Age: 74
Setting detail: RECURRING SERIES
Discharge: HOME | End: 2024-11-16
Payer: MEDICARE

## 2024-11-13 PROCEDURE — 97110 THERAPEUTIC EXERCISES: CPT

## 2024-11-13 NOTE — PROGRESS NOTES
PHYSICAL THERAPY - MEDICARE DAILY TREATMENT NOTE (updated 3/23)      Date: 2024          Patient Name:  Isabel Solano :  1950   Medical   Diagnosis:  Pain in right shoulder [M25.511] Treatment Diagnosis:  M25.511  RIGHT SHOULDER PAIN    Referral Source:  Tonio Bhardwaj MD Insurance:   Payor: MEDICARE / Plan: MEDICARE PART A AND B / Product Type: *No Product type* /                     Patient  verified yes     Visit #   Current  / Total 2 24   Time   In / Out 1008 1052   Total Treatment Time 44   Total Timed Codes 44   1:1 Treatment Time 33      [facility] Totals Reminder:  bill using total billable   min of TIMED therapeutic procedures and modalities.   8-22 min = 1 unit; 23-37 min = 2 units; 38-52 min = 3 units; 53-67 min = 4 units; 68-82 min = 5 units            SUBJECTIVE    Pain Level (0-10 scale): Rest/Movement: 2/3-4    Any medication changes, allergies to medications, adverse drug reactions, diagnosis change, or new procedure performed?: [x] No    [] Yes (see summary sheet for update)  Medications: Verified on Patient Summary List    Subjective functional status/changes:     Patient noted things have been feeling about the same since previous treatment session, but \"maybe a little better,\". Patient does note slight increase in neck stiffness in the last few days.     OBJECTIVE      Therapeutic Procedures:  Tx Min Billable or 1:1 Min (if diff from Tx Min) Procedure, Rationale, Specifics   [phone] [id number] Therapeutic Exercise (timed):  increase ROM, strength, coordination, balance, and proprioception to improve patient's ability to progress to PLOF and address remaining functional goals. (see flow sheet as applicable)     Details if applicable:                         44 33    Total Total           [x]  Patient Education billed concurrently with other procedures   [x] Review HEP    [] Progressed/Changed HEP, detail:    [] Other detail:         Other Objective/Functional Measures  NA    Pain

## 2024-11-20 ENCOUNTER — HOSPITAL ENCOUNTER (OUTPATIENT)
Facility: HOSPITAL | Age: 74
Setting detail: RECURRING SERIES
Discharge: HOME | End: 2024-11-23
Payer: MEDICARE

## 2024-11-20 PROCEDURE — 97110 THERAPEUTIC EXERCISES: CPT

## 2024-11-26 ENCOUNTER — APPOINTMENT (OUTPATIENT)
Facility: HOSPITAL | Age: 74
End: 2024-11-26
Payer: MEDICARE

## 2024-12-02 ENCOUNTER — HOSPITAL ENCOUNTER (OUTPATIENT)
Facility: HOSPITAL | Age: 74
Setting detail: RECURRING SERIES
End: 2024-12-02
Payer: MEDICARE

## 2024-12-04 ENCOUNTER — APPOINTMENT (OUTPATIENT)
Facility: HOSPITAL | Age: 74
End: 2024-12-04
Payer: MEDICARE

## 2024-12-09 ENCOUNTER — HOSPITAL ENCOUNTER (OUTPATIENT)
Facility: HOSPITAL | Age: 74
Setting detail: RECURRING SERIES
Discharge: HOME | End: 2024-12-12
Payer: MEDICARE

## 2024-12-09 PROCEDURE — 97110 THERAPEUTIC EXERCISES: CPT | Performed by: PHYSICAL THERAPIST

## 2024-12-09 NOTE — PROGRESS NOTES
PHYSICAL THERAPY - MEDICARE DAILY TREATMENT NOTE (updated 3/23)      Date: 2024          Patient Name:  Isabel Solano :  1950   Medical   Diagnosis:  Pain in right shoulder [M25.511] Treatment Diagnosis:  M25.511  RIGHT SHOULDER PAIN    Referral Source:  Tonio Bhardwaj MD Insurance:   Payor: MEDICARE / Plan: MEDICARE PART A AND B / Product Type: *No Product type* /                     Patient  verified yes     Visit #   Current  / Total 4 24   Time   In / Out 11:05am 11:58am   Total Treatment Time 53   Total Timed Codes 53   1:1 Treatment Time 43      [facility] Totals Reminder:  bill using total billable   min of TIMED therapeutic procedures and modalities.   8-22 min = 1 unit; 23-37 min = 2 units; 38-52 min = 3 units; 53-67 min = 4 units; 68-82 min = 5 units            SUBJECTIVE    Pain Level (0-10 scale): Rest/Movement: 2/3.5    Any medication changes, allergies to medications, adverse drug reactions, diagnosis change, or new procedure performed?: [x] No    [] Yes (see summary sheet for update)  Medications: Verified on Patient Summary List    Subjective functional status/changes:     The patient reports that she was sick for 2 weeks with a virus so wasn't as consistent with her exercises, but was prior to the illness. 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     OBJECTIVE      Therapeutic Procedures:  Tx Min Billable or 1:1 Min (if diff from Tx Min) Procedure, Rationale, Specifics   [id number] [phone] [id number] Therapeutic Exercise (timed):  increase ROM, strength, coordination, balance, and proprioception to improve patient's ability to progress to PLOF and address remaining functional goals. (see flow sheet as applicable)     Details if applicable:                         53 43    Total Total           [x]  Patient Education billed concurrently with other procedures   [x] Review HEP    []

## 2024-12-09 NOTE — PROGRESS NOTES
[facility]  [address]  [place] [address]  Phone: [phone]   Fax: [phone]     PHYSICAL THERAPY PROGRESS NOTE  Patient Name:  Isabel Solano :  1950   Treatment/Medical Diagnosis: Pain in right shoulder [M25.511]   Referral Source:  Tonio Bhardwaj MD     Date of Initial Visit:  24 Attended Visits:  4 Missed Visits:  2     SUMMARY OF TREATMENT/ASSESSMENT:  Therapy has included therex to assist with right upper arm pain, decreased strength, and decreased ROM, consistent with shoulder impingement and possible cervical radiculopathy (history of C5/C6 ACDF).     CURRENT STATUS/GOALS:    Therapy has only been limited to 3 follow up appointments after the initial evaluation, so progress has been slow in general. Her pain range over the past week has been 0-3/10 (1-9/10 at eval).     The patient has progressed with the following RIGHT shoulder A/PROM: Flex= 160/165, p! (150, p!/160, p!  at eval); Abd= 165/nt (155 (sore)/nt at eval); IR= t10, p!/100, p! (Hand to L2, p!/80, p! at eval); ER= nt/110 (nt/100 at eval).     The patient's cervical AROM rotation has changed as follows: R= 54 (38 at eval); L= 44 (32 at eval).      She initially would wake up at night from pain, 4-5x/wk (prior to her dospepack), which has improved greatly with no issues at night. Sitting in her recliner for 30 min or more (playing dominoes on her Ipad) would irritate, which has greatly improved with no issues. She initially felt 30% to 100% (how she felt 2 years ago), and now feels 55%. Reaching back to put a jacket on is still difficult. As the patient continues to have pain, along with strength and ROM deficits, she will benefit from continue therapy to progress to her overall functional mobility.    Short Term Goals: To be accomplished in 2 treatments:                         1.) The patient will be independent with their HEP

## 2024-12-11 ENCOUNTER — HOSPITAL ENCOUNTER (OUTPATIENT)
Facility: HOSPITAL | Age: 74
Setting detail: RECURRING SERIES
Discharge: HOME | End: 2024-12-14
Payer: MEDICARE

## 2024-12-11 PROCEDURE — 97110 THERAPEUTIC EXERCISES: CPT | Performed by: PHYSICAL THERAPIST

## 2024-12-11 NOTE — PROGRESS NOTES
progressed with increased resistance and mobility as tolerated.   Patient will continue to benefit from skilled PT / OT services to modify and progress therapeutic interventions, analyze and address functional mobility deficits, analyze and address ROM deficits, analyze and address strength deficits, analyze and address soft tissue restrictions, analyze and cue for proper movement patterns, and analyze and modify for postural abnormalities to address functional deficits and attain remaining goals.    Progress toward goals / Updated goals:  [x]  See Progress Note/Recertification    The patient is progressing towards goals.    PLAN  Yes  Continue plan of care  Re-Cert Due: 2/06/2025  [x]  Upgrade activities as tolerated  []  Discharge due to:  []  Other:      VICTOR MANUEL CUMMINGS, PT       12/11/2024       7:44 AM

## 2024-12-16 ENCOUNTER — APPOINTMENT (OUTPATIENT)
Facility: HOSPITAL | Age: 74
End: 2024-12-16
Payer: MEDICARE

## 2024-12-18 ENCOUNTER — HOSPITAL ENCOUNTER (OUTPATIENT)
Facility: HOSPITAL | Age: 74
Setting detail: RECURRING SERIES
Discharge: HOME | End: 2024-12-21
Payer: MEDICARE

## 2024-12-18 PROCEDURE — 97110 THERAPEUTIC EXERCISES: CPT | Performed by: PHYSICAL THERAPIST

## 2024-12-18 NOTE — PROGRESS NOTES
PHYSICAL THERAPY - MEDICARE DAILY TREATMENT NOTE (updated 3/23)      Date: 2024          Patient Name:  Isabel Solano :  1950   Medical   Diagnosis:  Pain in right shoulder [M25.511] Treatment Diagnosis:  M25.511  RIGHT SHOULDER PAIN    Referral Source:  Tonio Bhardwaj MD Insurance:   Payor: MEDICARE / Plan: MEDICARE PART A AND B / Product Type: *No Product type* /                     Patient  verified yes     Visit #   Current  / Total 6 24   Time   In / Out 11:00am 11:42am   Total Treatment Time 42   Total Timed Codes 42   1:1 Treatment Time 42      [facility] Totals Reminder:  bill using total billable   min of TIMED therapeutic procedures and modalities.   8-22 min = 1 unit; 23-37 min = 2 units; 38-52 min = 3 units; 53-67 min = 4 units; 68-82 min = 5 units            SUBJECTIVE    Pain Level (0-10 scale): Rest/Movement: 2/4    Any medication changes, allergies to medications, adverse drug reactions, diagnosis change, or new procedure performed?: [x] No    [] Yes (see summary sheet for update)  Medications: Verified on Patient Summary List    Subjective functional status/changes:     The patient reports that she didn't feel bad after last session, but she hosted a big dinner Friday night, and preparation and cleanup caused increased discomfort/irritation Saturday night.    OBJECTIVE      Therapeutic Procedures:  Tx Min Billable or 1:1 Min (if diff from Tx Min) Procedure, Rationale, Specifics   42 [id number] Therapeutic Exercise (timed):  increase ROM, strength, coordination, balance, and proprioception to improve patient's ability to progress to PLOF and address remaining functional goals. (see flow sheet as applicable)     Details if applicable:                         42 42    Total Total           [x]  Patient Education billed concurrently with other procedures   [x] Review HEP    [] Progressed/Changed HEP, detail:    [] Other detail:         Other Objective/Functional Measures  None

## 2024-12-23 ENCOUNTER — HOSPITAL ENCOUNTER (OUTPATIENT)
Facility: HOSPITAL | Age: 74
Setting detail: RECURRING SERIES
Discharge: HOME | End: 2024-12-26
Payer: MEDICARE

## 2024-12-23 PROCEDURE — 97110 THERAPEUTIC EXERCISES: CPT | Performed by: PHYSICAL THERAPIST

## 2024-12-23 NOTE — PROGRESS NOTES
PHYSICAL THERAPY - MEDICARE DAILY TREATMENT NOTE (updated 3/23)      Date: 2024          Patient Name:  Isabel Solano :  1950   Medical   Diagnosis:  Pain in right shoulder [M25.511] Treatment Diagnosis:  M25.511  RIGHT SHOULDER PAIN    Referral Source:  Tonio Bhardwaj MD Insurance:   Payor: MEDICARE / Plan: MEDICARE PART A AND B / Product Type: *No Product type* /                     Patient  verified yes     Visit #   Current  / Total 7 24   Time   In / Out 11:08am 11:55am   Total Treatment Time 47   Total Timed Codes 47   1:1 Treatment Time 40      [facility] Totals Reminder:  bill using total billable   min of TIMED therapeutic procedures and modalities.   8-22 min = 1 unit; 23-37 min = 2 units; 38-52 min = 3 units; 53-67 min = 4 units; 68-82 min = 5 units            SUBJECTIVE    Pain Level (0-10 scale): Rest/Movement: 1/1    Any medication changes, allergies to medications, adverse drug reactions, diagnosis change, or new procedure performed?: [x] No    [] Yes (see summary sheet for update)  Medications: Verified on Patient Summary List    Subjective functional status/changes:     The patient reports that she felt pretty good after last time.    OBJECTIVE      Therapeutic Procedures:  Tx Min Billable or 1:1 Min (if diff from Tx Min) Procedure, Rationale, Specifics   [id number] Therapeutic Exercise (timed):  increase ROM, strength, coordination, balance, and proprioception to improve patient's ability to progress to PLOF and address remaining functional goals. (see flow sheet as applicable)     Details if applicable:                         47 40    Total Total           [x]  Patient Education billed concurrently with other procedures   [x] Review HEP    [] Progressed/Changed HEP, detail:    [] Other detail:         Other Objective/Functional Measures  None noted    Pain Level at end of session (0-10 scale): Rest/Movement: 1/2      Assessment   The patient progressed with increased

## 2024-12-26 ENCOUNTER — HOSPITAL ENCOUNTER (OUTPATIENT)
Facility: HOSPITAL | Age: 74
Setting detail: RECURRING SERIES
Discharge: HOME | End: 2024-12-29
Payer: MEDICARE

## 2024-12-26 PROCEDURE — 97110 THERAPEUTIC EXERCISES: CPT

## 2024-12-26 NOTE — PROGRESS NOTES
PHYSICAL THERAPY - MEDICARE DAILY TREATMENT NOTE (updated 3/23)      Date: 2024          Patient Name:  Isabel Solano :  1950   Medical   Diagnosis:  Pain in right shoulder [M25.511] Treatment Diagnosis:  M25.511  RIGHT SHOULDER PAIN    Referral Source:  Tonio Bhardwaj MD Insurance:   Payor: MEDICARE / Plan: MEDICARE PART A AND B / Product Type: *No Product type* /                     Patient  verified yes     Visit #   Current  / Total 8 24   Time   In / Out 11:00 am 11:48 m   Total Treatment Time 48   Total Timed Codes 48   1:1 Treatment Time 25      [facility] Totals Reminder:  bill using total billable   min of TIMED therapeutic procedures and modalities.   8-22 min = 1 unit; 23-37 min = 2 units; 38-52 min = 3 units; 53-67 min = 4 units; 68-82 min = 5 units            SUBJECTIVE    Pain Level (0-10 scale): Rest/Movement: 1/2    Any medication changes, allergies to medications, adverse drug reactions, diagnosis change, or new procedure performed?: [x] No    [] Yes (see summary sheet for update)  Medications: Verified on Patient Summary List    Subjective functional status/changes:     Patient noted her pain has ranged from 0-2/10 (0-3/10 at eval) over the last week. Patient has continued to note doing well with sleeping at night and feels they are about 90% back to 100%, although continuing to note some issues with putting their coat back on and besides that feel pretty good. Patient has noted some soreness after long days where they \"overdo it,\" but not too bad after.        OBJECTIVE      Therapeutic Procedures:  Tx Min Billable or 1:1 Min (if diff from Tx Min) Procedure, Rationale, Specifics   48 [id number] Therapeutic Exercise (timed):  increase ROM, strength, coordination, balance, and proprioception to improve patient's ability to progress to PLOF and address remaining functional goals. (see flow sheet as applicable)     Details if applicable:                         48 25    Total Total

## 2024-12-30 ENCOUNTER — APPOINTMENT (OUTPATIENT)
Facility: HOSPITAL | Age: 74
End: 2024-12-30
Payer: MEDICARE

## 2025-01-03 NOTE — PROGRESS NOTES
[facility]  [address]  [place] [address]  Phone: [phone]   Fax: [phone]     DISCHARGE SUMMARY  Patient Name: Isabel Solano : 1950   Treatment/Medical Diagnosis: Pain in right shoulder [M25.511]   Referral Source: Tonio Bhardwaj MD     Date of Initial Visit: 2024 Attended Visits: 8 Missed Visits: 2     SUMMARY OF TREATMENT  Patient is a 74 year old being seen for a chief complaint of right upper arm pain, decreased strength, and decreased ROM, consistent with shoulder impingement and possible cervical radiculopathy (history of C5/C6 ACDF). Patient has been seen for 8 visits and has been treated using therapeutic exercises to make improvements with shoulder ROM, cervical ROM, subjective improvements, and functional outcome measures.    CURRENT STATUS  Patient is a 74 year old being seen for a chief complaint of right upper arm pain, decreased strength, and decreased ROM, consistent with shoulder impingement and possible cervical radiculopathy (history of C5/C6 ACDF). Patient has been seen for 8 visits and has made improvements with shoulder ROM, cervical ROM, subjective improvements, and functional outcome measures. Patient demonstrated improved shoulder ROM during today's reassessment with flexion (173 degrees v 160 degrees previously) and abduction (180 degrees v 165 degrees previously). Patient has also improved their cervical rotation ROM (R = 61 degrees; L = 62 degrees v R = 54 degrees previously; L = 44 degrees previously).  Patient has noted they are 90% back to 100%, only noting some trouble with putting their jacket on at times and after long days where they \"overdo it\". Patient noted their pain has ranged from 0-2/10 in the last week (0-3/10 previously). Patient has achieved 5/5 goals set for treatment and will be discharged following today's treatment session to manage exercises with

## 2025-01-13 DIAGNOSIS — I67.89 OTHER CEREBROVASCULAR DISEASE: ICD-10-CM

## 2025-01-13 DIAGNOSIS — I65.23 OCCLUSION AND STENOSIS OF BILATERAL CAROTID ARTERIES: ICD-10-CM

## 2025-01-14 RX ORDER — ATORVASTATIN CALCIUM 10 MG/1
10 TABLET, FILM COATED ORAL DAILY
Qty: 90 TABLET | Refills: 2 | Status: SHIPPED | OUTPATIENT
Start: 2025-01-14

## 2025-05-06 SDOH — ECONOMIC STABILITY: FOOD INSECURITY: WITHIN THE PAST 12 MONTHS, YOU WORRIED THAT YOUR FOOD WOULD RUN OUT BEFORE YOU GOT MONEY TO BUY MORE.: NEVER TRUE

## 2025-05-06 SDOH — HEALTH STABILITY: PHYSICAL HEALTH: ON AVERAGE, HOW MANY DAYS PER WEEK DO YOU ENGAGE IN MODERATE TO STRENUOUS EXERCISE (LIKE A BRISK WALK)?: 0 DAYS

## 2025-05-06 SDOH — ECONOMIC STABILITY: FOOD INSECURITY: WITHIN THE PAST 12 MONTHS, THE FOOD YOU BOUGHT JUST DIDN'T LAST AND YOU DIDN'T HAVE MONEY TO GET MORE.: NEVER TRUE

## 2025-05-06 SDOH — HEALTH STABILITY: PHYSICAL HEALTH: ON AVERAGE, HOW MANY MINUTES DO YOU ENGAGE IN EXERCISE AT THIS LEVEL?: 0 MIN

## 2025-05-06 SDOH — ECONOMIC STABILITY: INCOME INSECURITY: IN THE LAST 12 MONTHS, WAS THERE A TIME WHEN YOU WERE NOT ABLE TO PAY THE MORTGAGE OR RENT ON TIME?: NO

## 2025-05-06 SDOH — ECONOMIC STABILITY: TRANSPORTATION INSECURITY
IN THE PAST 12 MONTHS, HAS THE LACK OF TRANSPORTATION KEPT YOU FROM MEDICAL APPOINTMENTS OR FROM GETTING MEDICATIONS?: NO

## 2025-05-06 SDOH — ECONOMIC STABILITY: TRANSPORTATION INSECURITY
IN THE PAST 12 MONTHS, HAS LACK OF TRANSPORTATION KEPT YOU FROM MEETINGS, WORK, OR FROM GETTING THINGS NEEDED FOR DAILY LIVING?: NO

## 2025-05-06 ASSESSMENT — PATIENT HEALTH QUESTIONNAIRE - PHQ9
1. LITTLE INTEREST OR PLEASURE IN DOING THINGS: NOT AT ALL
SUM OF ALL RESPONSES TO PHQ QUESTIONS 1-9: 0
2. FEELING DOWN, DEPRESSED OR HOPELESS: NOT AT ALL
SUM OF ALL RESPONSES TO PHQ QUESTIONS 1-9: 0

## 2025-05-06 ASSESSMENT — LIFESTYLE VARIABLES
HOW MANY STANDARD DRINKS CONTAINING ALCOHOL DO YOU HAVE ON A TYPICAL DAY: 0
HOW OFTEN DO YOU HAVE A DRINK CONTAINING ALCOHOL: NEVER
HOW OFTEN DO YOU HAVE SIX OR MORE DRINKS ON ONE OCCASION: 1
HOW MANY STANDARD DRINKS CONTAINING ALCOHOL DO YOU HAVE ON A TYPICAL DAY: PATIENT DOES NOT DRINK
HOW OFTEN DO YOU HAVE A DRINK CONTAINING ALCOHOL: 1

## 2025-05-08 NOTE — PROGRESS NOTES
HISTORY OF PRESENT ILLNESS   Isabel Solano   is a 74 y.o.  female.  Fu  HLD osteoporosis ,cervical  post laminectomy syndrome hx skin cancer on nose , hx colon polyp and medicare wellness---  Gyn Dr Bates---had mammogram this past year  Last DEXA at gyn office 2-3 yrs ago--on holiday from [place] per pt    Sees DERM yearly  Went to PT last year for right shoulder pain and possible cervical radicular pain--shoulder pain improved now per pt    Less exercise this year, house work  Weight up a few lbs after traveling  Sleeps well . Apple watch alerted possible ZANDRA but she feels she sleeps well. No witnessed apnea. Denies excess daytime sleepiness    Last oV  Denies neck pain  now     Walks for exercise 2 miles twice per week.  Core exericses        Patient Active Problem List    Diagnosis Date Noted    HLD (hyperlipidemia) 11/09/2023    Osteopenia 11/09/2023    Cerebral microvascular disease 03/15/2018    Cervical post-laminectomy syndrome 03/15/2018    Carpal tunnel syndrome of right wrist 03/15/2018     Current Outpatient Medications   Medication Sig Dispense Refill    atorvastatin (LIPITOR) 10 MG tablet TAKE ONE TABLET BY MOUTH ONE TIME DAILY 90 tablet 2    Multiple Vitamins-Minerals (MULTIVITAMIN WITH IRON-MINERALS) liquid Take by mouth daily      Cholecalciferol (VITAMIN D3) 10 MCG (400 UNIT) CAPS Take 1 tablet by mouth Every Day      aspirin 81 MG EC tablet Take by mouth daily      calcium carb-cholecalciferol 600-10 MG-MCG TABS per tab Take 2 tablets by mouth daily      Estradiol (VAGIFEM) 10 MCG TABS vaginal tablet 1 tablet Twice a Week       No current facility-administered medications for this visit.     No Known Allergies  Social History     Tobacco Use    Smoking status: Never    Smokeless tobacco: Never   Substance Use Topics    Alcohol use: No        BMP:   Lab Results   Component Value Date/Time     11/10/2023 10:51 AM    K 4.7 11/10/2023 10:51 AM     11/10/2023 10:51 AM    CO2 29 11/10/2023

## 2025-05-09 ENCOUNTER — OFFICE VISIT (OUTPATIENT)
Age: 75
End: 2025-05-09
Payer: MEDICARE

## 2025-05-09 VITALS
HEIGHT: 64 IN | DIASTOLIC BLOOD PRESSURE: 64 MMHG | RESPIRATION RATE: 18 BRPM | BODY MASS INDEX: 25.99 KG/M2 | TEMPERATURE: 97.1 F | OXYGEN SATURATION: 97 % | HEART RATE: 76 BPM | WEIGHT: 152.2 LBS | SYSTOLIC BLOOD PRESSURE: 128 MMHG

## 2025-05-09 DIAGNOSIS — Z00.00 MEDICARE ANNUAL WELLNESS VISIT, SUBSEQUENT: Primary | ICD-10-CM

## 2025-05-09 DIAGNOSIS — E78.5 HYPERLIPIDEMIA, UNSPECIFIED HYPERLIPIDEMIA TYPE: ICD-10-CM

## 2025-05-09 DIAGNOSIS — M81.0 OSTEOPOROSIS, UNSPECIFIED OSTEOPOROSIS TYPE, UNSPECIFIED PATHOLOGICAL FRACTURE PRESENCE: ICD-10-CM

## 2025-05-09 PROCEDURE — 99214 OFFICE O/P EST MOD 30 MIN: CPT | Performed by: INTERNAL MEDICINE

## 2025-05-09 NOTE — PROGRESS NOTES
Patient identified with two identification factors, Name and Date of Birth.    Chief Complaint   Patient presents with    Medicare AWV       /64 (BP Site: Left Upper Arm, Patient Position: Sitting, BP Cuff Size: Medium Adult)   Pulse 76   Temp 97.1 °F (36.2 °C) (Temporal)   Resp 18   Ht 1.626 m (5' 4\")   Wt 69 kg (152 lb 3.2 oz)   SpO2 97%   BMI 26.13 kg/m²       1. \"Have you been to the ER, urgent care clinic since your last visit?  Hospitalized since your last visit?\" No     2. \"Have you seen or consulted any other health care providers outside of the [facility] since your last visit?\" No

## 2025-05-09 NOTE — PATIENT INSTRUCTIONS
Learning About Being Active as an Older Adult  Why is being active important as you get older?     Being active is one of the best things you can do for your health. And it's never too late to start. Being active--or getting active, if you aren't already--has definite benefits. It can:  Give you more energy,  Keep your mind sharp.  Improve balance to reduce your risk of falls.  Help you manage chronic illness with fewer medicines.  No matter how old you are, how fit you are, or what health problems you have, there is a form of activity that will work for you. And the more physical activity you can do, the better your overall health will be.  What kinds of activity can help you stay healthy?  Being more active will make your daily activities easier. Physical activity includes planned exercise and things you do in daily life. There are four types of activity:  Aerobic.  Doing aerobic activity makes your heart and lungs strong.  Includes walking, dancing, and gardening.  Aim for at least 2½ hours spread throughout the week.  It improves your energy and can help you sleep better.  Muscle-strengthening.  This type of activity can help maintain muscle and strengthen bones.  Includes climbing stairs, using resistance bands, and lifting or carrying heavy loads.  Aim for at least twice a week.  It can help protect the knees and other joints.  Stretching.  Stretching gives you better range of motion in joints and muscles.  Includes upper arm stretches, calf stretches, and gentle yoga.  Aim for at least twice a week, preferably after your muscles are warmed up from other activities.  It can help you function better in daily life.  Balancing.  This helps you stay coordinated and have good posture.  Includes heel-to-toe walking, sussy chi, and certain types of yoga.  Aim for at least 3 days a week.  It can reduce your risk of falling.  Even if you have a hard time meeting the recommendations, it's better to be more active

## 2025-05-10 ENCOUNTER — RESULTS FOLLOW-UP (OUTPATIENT)
Age: 75
End: 2025-05-10

## 2025-05-10 LAB
ALBUMIN SERPL-MCNC: 4 G/DL (ref 3.5–5)
ALBUMIN/GLOB SERPL: 1.4 (ref 1.1–2.2)
ALP SERPL-CCNC: 69 U/L (ref 45–117)
ALT SERPL-CCNC: 21 U/L (ref 12–78)
ANION GAP SERPL CALC-SCNC: 1 MMOL/L (ref 2–12)
AST SERPL-CCNC: 21 U/L (ref 15–37)
BILIRUB SERPL-MCNC: 1 MG/DL (ref 0.2–1)
BUN SERPL-MCNC: 15 MG/DL (ref 6–20)
BUN/CREAT SERPL: 15 (ref 12–20)
CALCIUM SERPL-MCNC: 10.5 MG/DL (ref 8.5–10.1)
CHLORIDE SERPL-SCNC: 105 MMOL/L (ref 97–108)
CHOLEST SERPL-MCNC: 182 MG/DL
CO2 SERPL-SCNC: 32 MMOL/L (ref 21–32)
CREAT SERPL-MCNC: 0.99 MG/DL (ref 0.55–1.02)
ERYTHROCYTE [DISTWIDTH] IN BLOOD BY AUTOMATED COUNT: 13.2 % (ref 11.5–14.5)
GLOBULIN SER CALC-MCNC: 2.9 G/DL (ref 2–4)
GLUCOSE SERPL-MCNC: 86 MG/DL (ref 65–100)
HCT VFR BLD AUTO: 43.4 % (ref 35–47)
HDLC SERPL-MCNC: 61 MG/DL
HDLC SERPL: 3 (ref 0–5)
HGB BLD-MCNC: 14.3 G/DL (ref 11.5–16)
LDLC SERPL CALC-MCNC: 89.8 MG/DL (ref 0–100)
MCH RBC QN AUTO: 29.5 PG (ref 26–34)
MCHC RBC AUTO-ENTMCNC: 32.9 G/DL (ref 30–36.5)
MCV RBC AUTO: 89.7 FL (ref 80–99)
NRBC # BLD: 0 K/UL (ref 0–0.01)
NRBC BLD-RTO: 0 PER 100 WBC
PLATELET # BLD AUTO: 225 K/UL (ref 150–400)
PMV BLD AUTO: 10.9 FL (ref 8.9–12.9)
POTASSIUM SERPL-SCNC: 4.5 MMOL/L (ref 3.5–5.1)
PROT SERPL-MCNC: 6.9 G/DL (ref 6.4–8.2)
RBC # BLD AUTO: 4.84 M/UL (ref 3.8–5.2)
SODIUM SERPL-SCNC: 138 MMOL/L (ref 136–145)
TRIGL SERPL-MCNC: 156 MG/DL
TSH SERPL DL<=0.05 MIU/L-ACNC: 2.65 UIU/ML (ref 0.36–3.74)
VLDLC SERPL CALC-MCNC: 31.2 MG/DL
WBC # BLD AUTO: 5.6 K/UL (ref 3.6–11)